# Patient Record
Sex: MALE | Race: WHITE | NOT HISPANIC OR LATINO | Employment: OTHER | URBAN - METROPOLITAN AREA
[De-identification: names, ages, dates, MRNs, and addresses within clinical notes are randomized per-mention and may not be internally consistent; named-entity substitution may affect disease eponyms.]

---

## 2021-04-29 ENCOUNTER — CONSULT (OUTPATIENT)
Dept: PAIN MEDICINE | Facility: CLINIC | Age: 41
End: 2021-04-29
Payer: COMMERCIAL

## 2021-04-29 ENCOUNTER — TELEPHONE (OUTPATIENT)
Dept: PAIN MEDICINE | Facility: CLINIC | Age: 41
End: 2021-04-29

## 2021-04-29 VITALS
BODY MASS INDEX: 36.26 KG/M2 | HEIGHT: 71 IN | HEART RATE: 79 BPM | DIASTOLIC BLOOD PRESSURE: 90 MMHG | SYSTOLIC BLOOD PRESSURE: 130 MMHG | TEMPERATURE: 98.2 F | WEIGHT: 259 LBS

## 2021-04-29 DIAGNOSIS — M54.50 CHRONIC MIDLINE LOW BACK PAIN, UNSPECIFIED WHETHER SCIATICA PRESENT: ICD-10-CM

## 2021-04-29 DIAGNOSIS — M48.061 LUMBAR FORAMINAL STENOSIS: ICD-10-CM

## 2021-04-29 DIAGNOSIS — G89.29 CHRONIC MIDLINE LOW BACK PAIN, UNSPECIFIED WHETHER SCIATICA PRESENT: Primary | ICD-10-CM

## 2021-04-29 DIAGNOSIS — M54.50 CHRONIC MIDLINE LOW BACK PAIN, UNSPECIFIED WHETHER SCIATICA PRESENT: Primary | ICD-10-CM

## 2021-04-29 DIAGNOSIS — G89.29 CHRONIC MIDLINE LOW BACK PAIN, UNSPECIFIED WHETHER SCIATICA PRESENT: ICD-10-CM

## 2021-04-29 PROCEDURE — 99204 OFFICE O/P NEW MOD 45 MIN: CPT | Performed by: ANESTHESIOLOGY

## 2021-04-29 RX ORDER — METHYLPREDNISOLONE 4 MG/1
TABLET ORAL
Qty: 1 EACH | Refills: 0 | Status: SHIPPED | OUTPATIENT
Start: 2021-04-29

## 2021-04-29 RX ORDER — METHYLPREDNISOLONE 4 MG/1
TABLET ORAL
Qty: 1 EACH | Refills: 0 | Status: SHIPPED | OUTPATIENT
Start: 2021-04-29 | End: 2021-04-29 | Stop reason: SDUPTHER

## 2021-04-29 RX ORDER — MULTIVITAMIN
1 TABLET ORAL DAILY
COMMUNITY

## 2021-04-29 NOTE — PATIENT INSTRUCTIONS
Chronic Back Pain   WHAT YOU NEED TO KNOW:   What is chronic back pain? Chronic back pain is back pain that lasts 3 months or longer  This may include pain that has not been controlled or does not improve with treatment  Your back pain may cause weakness or pain that spreads to your arms or legs  What causes or increases my risk for chronic back pain? Conditions that affect the spine, joints, or muscles can cause back pain  These may include arthritis, spinal stenosis (narrowing of the spinal column), muscle tension, or breakdown of the spinal discs  The following increase your risk for back pain:  · Aging    · Lack of regular physical activity     · Repeated bending, lifting, or twisting, or lifting heavy items    · Obesity or pregnancy     · Injury from a fall or accident    · Driving, sitting, or standing for long periods    · Bad posture while sitting or standing    How is chronic back pain diagnosed? Your healthcare provider will ask if you have any medical conditions  He or she may ask if you have a history of back pain and how it started  He or she may watch you stand and walk, and check your range of motion  Show him or her where you feel pain and what makes it better or worse  Describe the pain, how bad it is, and how long it lasts  Tell your provider if your pain worsens at night or when you lie on your back  How is chronic back pain treated? · NSAIDs  help decrease swelling and pain or fever  This medicine is available with or without a doctor's order  NSAIDs can cause stomach bleeding or kidney problems in certain people  If you take blood thinner medicine, always ask your healthcare provider if NSAIDs are safe for you  Always read the medicine label and follow directions  · Acetaminophen  decreases pain and fever  It is available without a doctor's order  Ask how much to take and how often to take it  Follow directions   Read the labels of all other medicines you are using to see if they also contain acetaminophen, or ask your doctor or pharmacist  Acetaminophen can cause liver damage if not taken correctly  Do not use more than 4 grams (4,000 milligrams) total of acetaminophen in one day  · Prescription pain medicine  called narcotics or opioids may be given for certain types of chronic pain  Ask your healthcare provider how to take this medicine safely  · Muscle relaxers  help decrease pain and muscle spasms  · Steroids  decrease inflammation that causes pain  · Anesthetic  medicines may be injected in or around a nerve to block pain signals from the nerves  · Antidepressants  may be used to help decrease or prevent the symptoms of depression or anxiety  They are also used to treat nerve pain  How can I manage my symptoms? · Apply ice for 15 to 20 minutes every hour, or as directed  Use an ice pack, or put crushed ice in a plastic bag  Cover it with a towel before you apply it to your skin  Ice decreases pain and helps prevent tissue damage  · Apply heat for 20 to 30 minutes every 2 hours, or as directed  Heat helps decrease pain and muscle spasms  · Use massage to loosen tense muscles  Massage may relieve back pain caused by tight muscles  Regular massages may help prevent this kind of back pain  · Ask about acupuncture for pain relief  Back pain is sometimes relieved with acupuncture  Talk to your healthcare provider before you get this treatment to make sure it is safe for you  What else can I do to relieve or prevent back pain? · Manage stress  Stress can cause back pain or make it worse  Some ways to reduce stress are listening to music, meditating, or using aromatherapy  It may help to talk with a therapist about anything that is causing you stress  Your healthcare provider can give you more information  · Stay active as much as you can without causing more pain  Ask your healthcare provider what exercises are right for you   Do not sit or lie down for long periods  This could make your back pain worse  Yoga or similar gentle movements may help relieve pain and tension in your back  Go slowly and do not strain your back as you do any movement  · Be careful when you lift heavy objects  Do not lift anything heavy until your pain is gone  Never strain your back when you lift a heavy item  If possible, ask someone to help you  · Go to physical therapy as directed  A physical therapist can teach you exercises to help improve movement and strength, and to decrease pain  When should I call my doctor? · You have severe pain  · You have new numbness, tingling, or weakness, especially in your lower back, legs, arms, or genital area  · You lose control of your bladder or bowel movements  · You have a fever or sudden weight loss  · You have new or worse pain  · You have questions or concerns about your condition or care  CARE AGREEMENT:   You have the right to help plan your care  Learn about your health condition and how it may be treated  Discuss treatment options with your healthcare providers to decide what care you want to receive  You always have the right to refuse treatment  The above information is an  only  It is not intended as medical advice for individual conditions or treatments  Talk to your doctor, nurse or pharmacist before following any medical regimen to see if it is safe and effective for you  © Copyright [address] Information is for End User's use only and may not be sold, redistributed or otherwise used for commercial purposes   All illustrations and images included in CareNotes® are the copyrighted property of [employer]  or [address] [place] [address]

## 2021-04-29 NOTE — TELEPHONE ENCOUNTER
Patient called to make us aware, pharmacy called his script is ready but it was sent to [facility] [place]     Medication: methylPREDNISolone 4 MG tablet therapy pack         Please send script to correct pharmacy     [facility] [address] [phone]    Thank you     [phone]

## 2021-04-29 NOTE — PROGRESS NOTES
Assessment  1  Chronic midline low back pain, unspecified whether sciatica present    2  Lumbar foraminal stenosis        Plan  50-year-old male, self-referred, last seen by our practice in 2015, presenting for interval consultation regarding a long-standing history of lumbosacral back pain without any radicular symptoms into his lower extremities  He denies any specific trauma or inciting event  The pain has worsened over the past year  He does have an old MRI of the lumbar spine from 2006 showing small disc bulge at L4-5 with mild bilateral foraminal stenosis at L4-5 and L5-S1  He has not done any recent formal physical therapy  He has tried Tylenol and NSAIDs without any significant relief  Patient's low back pain does have a myofascial and possibly facet mediated component  Discogenic causes of low back pain also on the differential     1  I will prescribe physical therapy to reduce pain and improved function  2  Medrol Dosepak was prescribed for pain relief  3  X-ray of the lumbar spine has been ordered  4  I will follow up the patient in 6 weeks and if he does not respond to conservative treatment may consider an updated MRI of the lumbar spine without contrast         My impressions and treatment recommendations were discussed in detail with the patient who verbalized understanding and had no further questions  Discharge instructions were provided  I personally saw and examined the patient and I agree with the above discussed plan of care  No orders of the defined types were placed in this encounter      New Medications Ordered This Visit   Medications    Multiple Vitamin (multivitamin) tablet     Sig: Take 1 tablet by mouth daily       History of Present Illness    Ar Cervantes is a 42 y o  male , self-referred, last seen by our practice in 2015, presenting for interval consultation regarding a long-standing history of lumbosacral back pain without any radicular symptoms into his lower extremities  He denies any specific trauma or inciting event  The pain has worsened over the past year  He denies any bladder or bowel incontinence or saddle anesthesia  He does have an old MRI of the lumbar spine from 2006 showing small disc bulge at L4-5 with mild bilateral foraminal stenosis at L4-5 and L5-S1  He has not done any recent formal physical therapy  He has tried Tylenol and NSAIDs without any significant relief  The patient rates his pain a 10/10 on the pain is constant  The pain is not follow any particular pattern throughout the day  The pain is described as shooting, sharp, cutting, pins and needles, pressure like, and throbbing  The pain is increased with prior, standing, bending, walking, exercise, coughing, and sneezing  He denies any alleviating factors  Other than as stated above, the patient denies any interval changes in medications, medical condition, mental condition, symptoms, or allergies since the last office visit  I have personally reviewed and/or updated the patient's past medical history, past surgical history, family history, social history, current medications, allergies, and vital signs today  Review of Systems   Constitutional: Negative for fever and unexpected weight change  HENT: Negative for trouble swallowing  Eyes: Negative for visual disturbance  Respiratory: Negative for shortness of breath and wheezing  Cardiovascular: Negative for chest pain and palpitations  Gastrointestinal: Negative for constipation, diarrhea, nausea and vomiting  Endocrine: Negative for cold intolerance, heat intolerance and polydipsia  Genitourinary: Negative for difficulty urinating and frequency  Musculoskeletal: Positive for back pain and myalgias  Negative for arthralgias, gait problem and joint swelling  Skin: Negative for rash  Neurological: Negative for dizziness, seizures, syncope, weakness and headaches     Hematological: Does not bruise/bleed easily  Psychiatric/Behavioral: Negative for dysphoric mood  All other systems reviewed and are negative  There is no problem list on file for this patient  History reviewed  No pertinent past medical history  History reviewed  No pertinent surgical history  History reviewed  No pertinent family history  Social History     Occupational History    Not on file   Tobacco Use    Smoking status: Current Some Day Smoker    Smokeless tobacco: Never Used   Substance and Sexual Activity    Alcohol use: Yes    Drug use: Never    Sexual activity: Not on file       Current Outpatient Medications on File Prior to Visit   Medication Sig    Multiple Vitamin (multivitamin) tablet Take 1 tablet by mouth daily     No current facility-administered medications on file prior to visit  No Known Allergies    Physical Exam    /90   Pulse 79   Temp 98 2 °F (36 8 °C) (Oral)   Ht 5' 11" (1 803 m)   Wt 117 kg (259 lb)   BMI 36 12 kg/m²     Constitutional: normal, well developed, well nourished, alert, in no distress and non-toxic and no overt pain behavior  Eyes: anicteric  HEENT: grossly intact  Neck: supple, symmetric, trachea midline and no masses   Pulmonary:even and unlabored  Cardiovascular:No edema or pitting edema present  Skin:Normal without rashes or lesions and well hydrated  Psychiatric:Mood and affect appropriate  Neurologic:Cranial Nerves II-XII grossly intact  Musculoskeletal:normal gait  Bilateral lumbar paraspinals tender to palpation ropy in texture  Bilateral SI joints and trochanteric flares nontender to palpation  Bilateral patellar and Achilles reflexes were 2/4 and symmetrical   No clonus was noted bilaterally  Bilateral lower extremity strength 5/5 in all muscle groups  Sensation intact to light touch in L3 through S1 dermatomes bilaterally  Negative straight leg raise bilaterally  Negative Wilbert's test bilaterally      Imaging  Imaging reviewed

## 2021-05-11 ENCOUNTER — APPOINTMENT (OUTPATIENT)
Dept: RADIOLOGY | Age: 41
End: 2021-05-11
Payer: COMMERCIAL

## 2021-05-11 DIAGNOSIS — G89.29 CHRONIC MIDLINE LOW BACK PAIN, UNSPECIFIED WHETHER SCIATICA PRESENT: ICD-10-CM

## 2021-05-11 DIAGNOSIS — M54.50 CHRONIC MIDLINE LOW BACK PAIN, UNSPECIFIED WHETHER SCIATICA PRESENT: ICD-10-CM

## 2021-05-11 PROCEDURE — 72110 X-RAY EXAM L-2 SPINE 4/>VWS: CPT

## 2021-05-12 ENCOUNTER — EVALUATION (OUTPATIENT)
Dept: PHYSICAL THERAPY | Facility: REHABILITATION | Age: 41
End: 2021-05-12
Payer: COMMERCIAL

## 2021-05-12 DIAGNOSIS — G89.29 CHRONIC MIDLINE LOW BACK PAIN, UNSPECIFIED WHETHER SCIATICA PRESENT: Primary | ICD-10-CM

## 2021-05-12 DIAGNOSIS — M54.50 CHRONIC MIDLINE LOW BACK PAIN, UNSPECIFIED WHETHER SCIATICA PRESENT: Primary | ICD-10-CM

## 2021-05-12 PROCEDURE — 97162 PT EVAL MOD COMPLEX 30 MIN: CPT | Performed by: PHYSICAL THERAPIST

## 2021-05-12 NOTE — PROGRESS NOTES
PT Evaluation     Today's date: 2021  Patient name: Alan Lo  : 1980  MRN: [id number]  Referring provider: Jama Murrell DO  Dx:   Encounter Diagnosis     ICD-10-CM    1  Chronic midline low back pain, unspecified whether sciatica present  M54 5 Ambulatory referral to Physical Therapy    G89 29                   Assessment  Assessment details: Patient presents with decreased lumbar ROM, decreased hip/core strength, postural deficits and decreased function secondary to chronic LBP without sciatica  Patient would benefit from skilled PT intervention to address these issues and to maximize function  Thank you for the referral   Impairments: abnormal or restricted ROM, activity intolerance, impaired physical strength, lacks appropriate home exercise program, pain with function, poor posture  and poor body mechanics  Other impairment: impaired LE flexibility  Barriers to therapy: Financial constraints (can only attend 1x/week)  Understanding of Dx/Px/POC: good   Prognosis: good    Goals  Short Term:  Pt will report decreased levels of pain by at least 2 subjective ratings in 4 weeks  Pt will demonstrate improved ROM by at least 25% in 4 weeks  Pt will demonstrate improved strength by 1/2 grade MMT in 4 weeks  Long Term:   Pt will be independent in their HEP in 8 weeks  Pt will demonstrate improved FOTO, > predicted level  Pt will be independent with all ADL's  Pt will resume exercising without pain  Pt will sleep through the night without waking due to pain    Plan  Plan details: Patient was educated in HEP and Plan of Care  All questions were answered to pt's satisfaction      Patient would benefit from: skilled physical therapy  Planned therapy interventions: abdominal trunk stabilization, manual therapy, neuromuscular re-education, patient education, body mechanics training, flexibility, therapeutic exercise, therapeutic activities, home exercise program and graded exercise  Frequency: 1x week  Duration in weeks: 8  Plan of Care beginning date: 2021  Plan of Care expiration date: 2021  Treatment plan discussed with: patient        Subjective Evaluation    History of Present Illness  Mechanism of injury: Pt is a 42 y o male with a c/o ongoing LBP for years, starting around  or so of insidious onset  Pt notes he used to play football and lift heavy weights and feels that may have contributed to it  Pt notes having numerous tests back in  or so and states "my lower back is a mess"  Pt was treated with medication in the past   Pt also notes receiving an injection in lumbar spine around  and experienced relief for several years  Pt states his pain returned about 2 years ago and has progressively worsened  Pt does not recall doing therapy for his lumbar spine  Pt reports pain/diffiuclty with bending down, yardwork (cutting grass), dressing (donning sneakers, tying shoes), showering, prolonged driving, prolonged standing in place, sleep  Pt would like to resume exercising without pain  Pain  At best pain ratin  At worst pain rating: 10  Location: lumbar spine  Relieving factors: change in position      Diagnostic Tests  X-ray: abnormal    [facility] comments: X-rays showed chronic L5 pars defect (grade 2 spondylolisthesis L5-S1)  Treatments  Previous treatment: medication  Patient Goals  Patient goals for therapy: decreased pain, return to sport/leisure activities and independence with ADLs/IADLs          Objective     Concurrent Complaints  Positive for disturbed sleep  Negative for bladder dysfunction, bowel dysfunction and saddle (S4) numbness    Additional Special Questions  Pt denies unexplained weight loss  Postural Observations  Seated posture: fair        Tenderness     Left Hip   Tenderness in the PSIS  Right Hip   Tenderness in the PSIS       Additional Tenderness Details  Mild TTP L3-5 SP    Neurological Testing     Reflexes   Left   Patellar (L4): brisk (3+)  Achilles (S1): normal (2+)  Clonus sign: negative    Right   Patellar (L4): brisk (3+)  Achilles (S1): normal (2+)  Clonus sign: negative    Additional Neurological Details  Pt denies N/T or radicular pain  Active Range of Motion     Additional Active Range of Motion Details  L/S AROM:  Flex=wfl with pain; repeated=same  Ext= 25% with pinching pain; repeated=same  RSB=50% without pain  LSB=75% with pain  R rot=75% with increased pain  L rot=75% with mild pian    Joint Play   Joints within functional limits: L1 and L2     Pain: L1, L2, L3 and L4     Strength/Myotome Testing     Left Hip   Planes of Motion   Flexion: 4+  Extension: 4  Abduction: 4  External rotation: 4-    Right Hip   Planes of Motion   Flexion: 4+  Extension: 4-  Abduction: 4  External rotation: 4-    Left Knee   Flexion: 5  Extension: 5    Right Knee   Flexion: 5  Extension: 5    Left Ankle/Foot   Dorsiflexion: 5  Plantar flexion: 5 (seated)  Great toe extension: 5    Right Ankle/Foot   Dorsiflexion: 5  Plantar flexion: 5 (seated)  Great toe extension: 5    Tests     Lumbar   Positive prone instability  and SIJ compression  Negative sacroiliac distraction and sacral thrust       Left   Negative crossed SLR, femoral stretch, passive SLR and slump test      Right   Negative crossed SLR, femoral stretch, passive SLR and slump test      Left Pelvic Girdle/Sacrum   Positive: thigh thrust and active SLR test      Right Pelvic Girdle/Sacrum   Positive: thigh thrust and active SLR test      Left Hip   Positive ANAYELI  Negative long sit  Right Hip   Positive ANAYELI  Negative long sit  Additional Tests Details  (+)active SLR w/stabilization b/l     General Comments:      Lumbar Comments  Decreased flexibility b/l HS, piriformis  Decreased hip IR b/l                  Precautions: chronic LBP      Manuals             Prone SIJ PA w/hip IR b/l                                                    Neuro Re-Ed             TA w/march TA w/kicks             TA w/bridges             TA w/clamshells             TA w/prone hip ext             Sidestepping w/TB                          Ther Ex             Bike or Nu-step             Seated lumbar flexion w/pball             TA w/TB LPD             TA w/TB multifidus press             TA w/mini squats                                                    Ther Activity                                       Gait Training                                       Modalities

## 2021-05-19 ENCOUNTER — OFFICE VISIT (OUTPATIENT)
Dept: PHYSICAL THERAPY | Facility: REHABILITATION | Age: 41
End: 2021-05-19
Payer: COMMERCIAL

## 2021-05-19 DIAGNOSIS — G89.29 CHRONIC MIDLINE LOW BACK PAIN, UNSPECIFIED WHETHER SCIATICA PRESENT: Primary | ICD-10-CM

## 2021-05-19 DIAGNOSIS — M54.50 CHRONIC MIDLINE LOW BACK PAIN, UNSPECIFIED WHETHER SCIATICA PRESENT: Primary | ICD-10-CM

## 2021-05-19 PROCEDURE — 97112 NEUROMUSCULAR REEDUCATION: CPT | Performed by: PHYSICAL THERAPIST

## 2021-05-19 NOTE — PROGRESS NOTES
Daily Note     Today's date: 2021  Patient name: Eufemia Banuelos  : 1980  MRN: [id number]  Referring provider: Anaid Tobias DO  Dx:   Encounter Diagnosis     ICD-10-CM    1  Chronic midline low back pain, unspecified whether sciatica present  M54 5     G89 29            Pt 1on1 for 22 mins and performed remaining TE's as part of IEP  Subjective: Pt reports HEP is going OK without questions  Pt notes no change in status at this time  Objective: See treatment diary below      Assessment: Tolerated treatment well  Pt fatigues easily with TE performance  Intermittent pinching pain with mini squats, multifidus press with L side towards the wall  Pt notes relief with manuals  Pt reports fatigue post session  Monitor response NV  Patient would benefit from continued PT      Plan: Continue per plan of care  Progress treatment as tolerated         Precautions: chronic LBP      Manuals             Prone SIJ PA w/hip IR b/l TP 5 mins                                                   Neuro Re-Ed             TA w/march x10            TA w/kicks x10            TA w/bridges 3" 2x10            TA w/clamshells 3"x10ea            TA w/prone hip ext 3"x10ea            Sidestepping w/TB otb 1 lap                         Ther Ex             Bike or Nu-step Bike x10'            Seated lumbar flexion w/pball 10"x10            TA w/TB LPD gtb 3"x15            TA w/TB multifidus press gtb            TA w/mini squats x15                                                   Ther Activity                                       Gait Training                                       Modalities

## 2021-05-26 ENCOUNTER — OFFICE VISIT (OUTPATIENT)
Dept: PHYSICAL THERAPY | Facility: REHABILITATION | Age: 41
End: 2021-05-26
Payer: COMMERCIAL

## 2021-05-26 DIAGNOSIS — G89.29 CHRONIC MIDLINE LOW BACK PAIN, UNSPECIFIED WHETHER SCIATICA PRESENT: Primary | ICD-10-CM

## 2021-05-26 DIAGNOSIS — M54.50 CHRONIC MIDLINE LOW BACK PAIN, UNSPECIFIED WHETHER SCIATICA PRESENT: Primary | ICD-10-CM

## 2021-05-26 PROCEDURE — 97112 NEUROMUSCULAR REEDUCATION: CPT | Performed by: PHYSICAL THERAPIST

## 2021-05-26 NOTE — PROGRESS NOTES
Daily Note     Today's date: 2021  Patient name: Edin Higginbotham  : 1980  MRN: [id number]  Referring provider: Kaylee Soto DO  Dx:   Encounter Diagnosis     ICD-10-CM    1  Chronic midline low back pain, unspecified whether sciatica present  M54 5     G89 29    Patient is treated by FORTINO Merritt under direct supervision of Stephane Lewis[facility] and completed rest of Navneet as part of IEP  Subjective: Pt reports completing HEP and feels a stretch with them  Pt complains of pain when bending over and when returning to standing from a seated position  Objective: See treatment diary below      Assessment: Tolerated treatment well  Patient notes relief in low back symptoms with bridges and with mobilizations  Pt reports that when he feels pain during Navneet (especially with squats), the majority is on the L side  Pt notes pinching on L side with multifidus press with L side facing the wall  Patient demonstrated fatigue post treatment and would benefit from continued PT      Plan: Continue per plan of care  Progress treatment as tolerated         Precautions: chronic LBP      Manuals            Prone SIJ PA w/hip IR b/l TP 5 mins TP 5'                                                  Neuro Re-Ed             TA w/march x10 x10           TA w/kicks x10 x10           TA w/bridges 3" 2x10 3" 2x10           TA w/clamshells 3"x10ea 3"x10 ea           TA w/prone hip ext 3"x10ea 3"x10 ea           Sidestepping w/TB otb 1 lap np                        Ther Ex             Bike or Nu-step Bike x10' Bike 10'           Seated lumbar flexion w/pball 10"x10 10"x10           TA w/TB LPD gtb 3"x15 gtb 3" 2x10           TA w/TB multifidus press gtb gtb 3"x10 ea           TA w/mini squats x15 x15                                                  Ther Activity                                       Gait Training                                       Modalities

## 2021-06-02 ENCOUNTER — OFFICE VISIT (OUTPATIENT)
Dept: PHYSICAL THERAPY | Facility: REHABILITATION | Age: 41
End: 2021-06-02
Payer: COMMERCIAL

## 2021-06-02 DIAGNOSIS — G89.29 CHRONIC MIDLINE LOW BACK PAIN, UNSPECIFIED WHETHER SCIATICA PRESENT: Primary | ICD-10-CM

## 2021-06-02 DIAGNOSIS — M54.50 CHRONIC MIDLINE LOW BACK PAIN, UNSPECIFIED WHETHER SCIATICA PRESENT: Primary | ICD-10-CM

## 2021-06-02 PROCEDURE — 97112 NEUROMUSCULAR REEDUCATION: CPT | Performed by: PHYSICAL THERAPIST

## 2021-06-02 NOTE — PROGRESS NOTES
Daily Note     Today's date: 2021  Patient name: Inga Kelly  : 1980  MRN: [id number]  Referring provider: Rajesh Bass DO  Dx:   Encounter Diagnosis     ICD-10-CM    1  Chronic midline low back pain, unspecified whether sciatica present  M54 5     G89 29    Patient is treated by FORTINO Figueroa under direct supervision of Stephane Lewis, PT         1on1 for 20 mins and performed remainder as part of IEP  Subjective: Patient reports he was sick  night from unknown cause and laid on the bathroom floor all night which lead to increased b/l low back pain  Pt required rest Monday and a heating pad, but it had little effect on the pain  Pt went to work on Tuesday but was only able to work for a little bit before he had to lay down with the heating pad  Pt notes he took Ibuprofen this morning to try and help with the pain  Objective: See treatment diary below      Assessment: Tolerated treatment fair  Pt performed modified PT session due to pain  Pt reported reduced pain post soft tissue massage and mobilizations  Prone hip extensions were stopped due to increase in pain  Pt noted that the seated lumbar flexion helped improve symptoms  Patient demonstrated fatigue post treatment and would benefit from continued PT      Plan: Continue per plan of care  Progress treatment as tolerated  Monitor symptoms NV       Precautions: chronic LBP      Manuals  6          Prone SIJ PA w/hip IR b/l TP 5 mins TP 5' TP 4'          IASTM paraspinals   TP 6'                                    Neuro Re-Ed             TA w/march x10 x10 x10          TA w/kicks x10 x10 x10          TA w/bridges 3" 2x10 3" 2x10 3" 2x10          TA w/clamshells 3"x10ea 3"x10 ea held          TA w/prone hip ext 3"x10ea 3"x10 ea 3"x10 ea          Sidestepping w/TB otb 1 lap np held                       Ther Ex             Bike or Nu-step Bike x10' Bike 10' Bike 10'          Seated lumbar flexion w/pball 10"x10 10"x10 10"x10          TA w/TB LPD gtb 3"x15 gtb 3" 2x10 gtb 2x10          TA w/TB multifidus press gtb gtb 3"x10 ea held          TA w/mini squats x15 x15 held                                                 Ther Activity                                       Gait Training                                       Modalities

## 2021-06-09 ENCOUNTER — OFFICE VISIT (OUTPATIENT)
Dept: PHYSICAL THERAPY | Facility: REHABILITATION | Age: 41
End: 2021-06-09
Payer: COMMERCIAL

## 2021-06-09 DIAGNOSIS — M54.50 CHRONIC MIDLINE LOW BACK PAIN, UNSPECIFIED WHETHER SCIATICA PRESENT: Primary | ICD-10-CM

## 2021-06-09 DIAGNOSIS — G89.29 CHRONIC MIDLINE LOW BACK PAIN, UNSPECIFIED WHETHER SCIATICA PRESENT: Primary | ICD-10-CM

## 2021-06-09 PROCEDURE — 97112 NEUROMUSCULAR REEDUCATION: CPT | Performed by: PHYSICAL THERAPIST

## 2021-06-09 NOTE — PROGRESS NOTES
Daily Note     Today's date: 2021  Patient name: Bethena Lesches  : 1980  MRN: [id number]  Referring provider: Miya Carter DO  Dx:   Encounter Diagnosis     ICD-10-CM    1  Chronic midline low back pain, unspecified whether sciatica present  M54 5     G89 29    Patient is treated by Vik Armenta SPT under direct supervision of Stephane Lewis, PT  Subjective: Patient reports he felt better after last session, noting that the IASTM really loosened him up  Pt explains he uses topical cream and heating pad for relief  He notes he is feeling ok today  Pt has doctor appointment tomorrow for back  Objective: See treatment diary below  FOTO was taken this session (current 48; projected 61)  Assessment: Tolerated treatment well  Pt exhibited positive response to manual techniques in session  Pt continues to note difficulty with clamshells and multifidus press  Patient demonstrated fatigue post treatment and would benefit from continued PT      Plan: Continue per plan of care  Progress treatment as tolerated         Precautions: chronic LBP      Manuals          Prone SIJ PA w/hip IR b/l TP 5 mins TP 5' TP 4' PS 4'         IASTM paraspinals   TP 6' TP 6'                                   Neuro Re-Ed             TA w/march x10 x10 x10 x10         TA w/kicks x10 x10 x10 x10         TA w/bridges 3" 2x10 3" 2x10 3" 2x10 3" 2x10         TA w/clamshells 3"x10ea 3"x10 ea held 3"x10 ea         TA w/prone hip ext 3"x10ea 3"x10 ea 3"x10 ea 3"x10ea         Sidestepping w/TB otb 1 lap np held nv                      Ther Ex             Bike or Nu-step Bike x10' Bike 10' Bike 10' Bike 10'          Seated lumbar flexion w/pball 10"x10 10"x10 10"x10 10"x10         TA w/TB LPD gtb 3"x15 gtb 3" 2x10 gtb 2x10 gtb 2x10         TA w/TB multifidus press gtb gtb 3"x10 ea held gtb 3"x10 ea         TA w/mini squats x15 x15 held [employer]                                                Ther Activity Gait Training                                       Modalities

## 2021-06-10 ENCOUNTER — OFFICE VISIT (OUTPATIENT)
Dept: PAIN MEDICINE | Facility: CLINIC | Age: 41
End: 2021-06-10
Payer: COMMERCIAL

## 2021-06-10 VITALS
HEART RATE: 57 BPM | SYSTOLIC BLOOD PRESSURE: 123 MMHG | WEIGHT: 258 LBS | BODY MASS INDEX: 36.12 KG/M2 | DIASTOLIC BLOOD PRESSURE: 79 MMHG | HEIGHT: 71 IN

## 2021-06-10 DIAGNOSIS — G89.29 CHRONIC MIDLINE LOW BACK PAIN, UNSPECIFIED WHETHER SCIATICA PRESENT: Primary | ICD-10-CM

## 2021-06-10 DIAGNOSIS — M54.50 CHRONIC MIDLINE LOW BACK PAIN, UNSPECIFIED WHETHER SCIATICA PRESENT: Primary | ICD-10-CM

## 2021-06-10 DIAGNOSIS — M46.1 SACROILIITIS (HCC): ICD-10-CM

## 2021-06-10 PROCEDURE — 99214 OFFICE O/P EST MOD 30 MIN: CPT | Performed by: NURSE PRACTITIONER

## 2021-06-10 NOTE — PATIENT INSTRUCTIONS
Sacroiliac Joint Injection   WHAT YOU NEED TO KNOW:   What do I need to know about a sacroiliac joint injection? A sacroiliac (SI) joint injection is done to diagnose or treat pain from sacroiliac joint syndrome  The pain caused by this syndrome may be felt in your lower back, buttocks, groin, and your thigh  How do I prepare for an SI injection? Your healthcare provider will ask you to not take any pain medicine the day of the injection  Ask him if there are any other medicines you should not take  You will need to find someone to drive you home after your procedure  What will happen during the SI injection? You will be awake for your injection  You may be given calming medicine if you are anxious  · You will lie on your stomach with a pillow under your abdomen  Your healthcare provider will give you an injection of medicine to numb the area  He may use an x-ray, ultrasound, or CT scan to find the area to inject  You may also be given an injection of contrast material to help your SI joint show up better  Then, your healthcare provider will inject local anesthesia, antiinflammatory medicine, or both into your SI joint  · Healthcare providers will watch you closely for any problems for up to 30 minutes after your injection  Your healthcare provider will check to see if your pain decreases after the injection  What are the risks of an SI injection? You may have some weakness for a short time after your injection  The SI injection can cause bleeding, infection, and pain  It can also cause temporary weakness in your leg and problems urinating  You may have an allergic reaction to the medicine that is injected into your SI joint  Your pain may return and you may need more treatment  CARE AGREEMENT:   You have the right to help plan your care  Learn about your health condition and how it may be treated  Discuss treatment options with your healthcare providers to decide what care you want to receive   You always have the right to refuse treatment  The above information is an  only  It is not intended as medical advice for individual conditions or treatments  Talk to your doctor, nurse or pharmacist before following any medical regimen to see if it is safe and effective for you  © Copyright [address] Information is for End User's use only and may not be sold, redistributed or otherwise used for commercial purposes   All illustrations and images included in CareNotes® are the copyrighted property of A D A M , Inc  or [address] [place] [address]

## 2021-06-10 NOTE — PROGRESS NOTES
Assessment:  1  Chronic midline low back pain, unspecified whether sciatica present    2  Sacroiliitis ([address] )        Plan:  1  Based on patient report and physical exam, the patient's symptomatology does seem to be consistent with sacroiliac mediated pain from sacroiliitis  We will schedule the patient for a right SIJ injection to decrease any inflammatory component of the patient's pain symptoms  Complete risks and benefits including bleeding, infection, tissue reaction, nerve injury and allergic reaction were discussed  The patient was agreeable and verbalized an understanding  2  Patient will continue with physical therapy  He does find some transient improvement with this  He is scheduled for 3 more sessions  If no relief after conservative therapy is completed, will order an MRI of the lumbar spine without contrast  3  Patient is not interested in oral medications at this time secondary to GI side effects  4  Patient will follow-up 4 weeks after the procedure or sooner if needed     [employer] software was used to dictate this note  It may contain errors with dictating incorrect words or incorrect spelling  Please contact the provider directly with any questions  History of Present Illness: The patient is a 42 y o  male last seen on 4/29/21 who presents for a follow up office visit in regards to chronic  Axial low back pain that is nonradiating into the lower extremities  the patient denies bowel or bladder incontinence or saddle anesthesia  X-ray of the lumbar spine reveals chronic bilateral pars defects at L5 with grade 2 spondylolisthesis of L5 on S1 with moderate degenerative disc disease and minimal facet degenerative changes at this level  The patient has been participating in physical therapy and has completed 5 sessions at this time with transient improvement of his symptoms  He rates his pain a 10/10 on the numeric pain rating scale    He states the pain is constant nature and bothersome throughout the entirety of the day  He characterizes the pain as burning, sharp, throbbing, pressure-like and shooting  The patient will occasionally take over-the-counter Aleve, Advil or Tylenol  He states he has taken Opana in the past and had a terrible time discontinuing the medication with experiencing significant withdrawal effects and would like to avoid any oral medications at this time  He was given an oral steroid after last office visit, however states he only took 1 pill because it caused rather extreme nausea  I have personally reviewed and/or updated the patient's past medical history, past surgical history, family history, social history, current medications, allergies, and vital signs today  Review of Systems:    Review of Systems   Respiratory: Negative for shortness of breath  Cardiovascular: Negative for chest pain  Gastrointestinal: Negative for constipation, diarrhea, nausea and vomiting  Musculoskeletal: Negative for arthralgias, gait problem, joint swelling and myalgias  Skin: Negative for rash  Neurological: Negative for dizziness, seizures and weakness  All other systems reviewed and are negative  History reviewed  No pertinent past medical history  History reviewed  No pertinent surgical history  History reviewed  No pertinent family history  Social History     Occupational History    Not on file   Tobacco Use    Smoking status: Current Some Day Smoker    Smokeless tobacco: Never Used   Substance and Sexual Activity    Alcohol use:  Yes    Drug use: Never    Sexual activity: Not on file         Current Outpatient Medications:     methylPREDNISolone 4 MG tablet therapy pack, Use as directed on package (Patient not taking: Reported on 6/10/2021), Disp: 1 each, Rfl: 0    Multiple Vitamin (multivitamin) tablet, Take 1 tablet by mouth daily, Disp: , Rfl:     No Known Allergies    Physical Exam:    /79   Pulse 57   Ht 5' 11" (1 803 m)   Wt 117 kg (258 lb)   BMI 35 98 kg/m²     Constitutional:normal, well developed, well nourished, alert, in no distress and non-toxic and no overt pain behavior  Eyes:anicteric  HEENT:grossly intact  Neck:supple, symmetric, trachea midline and no masses   Pulmonary:even and unlabored  Cardiovascular:No edema or pitting edema present  Skin:Normal without rashes or lesions and well hydrated  Psychiatric:Mood and affect appropriate  Neurologic:Cranial Nerves II-XII grossly intact  Musculoskeletal:Gait  Bilateral lumbar paraspinal musculature tender to palpation  Right SI joint tender to palpation  Bilateral lower extremity strength 5/5 in all muscle groups  Negative straight leg raise bilaterally  Positive Wilbert's, Tawnya finger and Gaenslen's test on the right negative on the left      Imaging  FL spine and pain procedure    (Results Pending)   LUMBAR SPINE   INDICATION: M54 5: Low back pain   G89 29: Other chronic pain  COMPARISON: None   VIEWS: XR SPINE LUMBAR MINIMUM 4 VIEWS NON INJURY   Upright images  FINDINGS:   There are 5 non rib bearing lumbar vertebral bodies  There is no evidence of acute fracture or destructive osseous lesion  Chronic bilateral pars defect at L5 with grade 2 spondylolisthesis of L5 on S1  Moderate disc height loss and minimal facet degenerative changes at L5-S1  Soft tissues are unremarkable  IMPRESSION:   No acute osseous abnormality  Chronic bilateral pars defect at L5 with grade 2 spondylolisthesis of L5 on S1  Degenerative changes as described           Orders Placed This Encounter   Procedures    FL spine and pain procedure

## 2021-06-16 ENCOUNTER — OFFICE VISIT (OUTPATIENT)
Dept: PHYSICAL THERAPY | Facility: REHABILITATION | Age: 41
End: 2021-06-16
Payer: COMMERCIAL

## 2021-06-16 DIAGNOSIS — M54.50 CHRONIC MIDLINE LOW BACK PAIN, UNSPECIFIED WHETHER SCIATICA PRESENT: Primary | ICD-10-CM

## 2021-06-16 DIAGNOSIS — G89.29 CHRONIC MIDLINE LOW BACK PAIN, UNSPECIFIED WHETHER SCIATICA PRESENT: Primary | ICD-10-CM

## 2021-06-16 PROCEDURE — 97112 NEUROMUSCULAR REEDUCATION: CPT | Performed by: PHYSICAL THERAPIST

## 2021-06-16 NOTE — PROGRESS NOTES
Daily Note     Today's date: 2021  Patient name: Allen Mays  : 1980  MRN: [id number]  Referring provider: Tammy Sotelo DO  Dx:   Encounter Diagnosis     ICD-10-CM    1  Chronic midline low back pain, unspecified whether sciatica present  M54 5     G89 29    Patient is treated by FORTINO Sharma under direct supervision of Stephane Lewis, PT         1on1 for 22 mins and performed remainder as part of IEP  Subjective: Pt reports no new complaints and feels about the same usual  Pt noted he is scheduled to get an injection  into his R SI joint  Pt explained he spent the weekend putting up lights on his house and bushes, along with yard work and required breaks due to pain from bending over  Objective: See treatment diary below      Assessment: Tolerated treatment well  Pt expresses increases in discomfort (points to lumbar and SI region) and difficulty with clamshell exercises  Pt noted relief with manuals during session and had reduced discomfort post session  Pt notes he typically experiences relief for  2 days after his PT sessions  Patient demonstrated fatigue post treatment and would benefit from continued PT      Plan: Continue per plan of care  Progress treatment as tolerated         Precautions: chronic LBP      Manuals         Prone SIJ PA w/hip IR b/l TP 5 mins TP 5' TP 4' PS 4' PS 5'        IASTM paraspinals   TP 6' TP 6' TP 6'                                  Neuro Re-Ed             TA w/march x10 x10 x10 x10 x10 x15       TA w/kicks x10 x10 x10 x10 x10 x15       TA w/bridges 3" 2x10 3" 2x10 3" 2x10 3" 2x10 3" 2x15        TA w/clamshells 3"x10ea 3"x10 ea held 3"x10 ea 3"x10ea        TA w/prone hip ext 3"x10ea 3"x10 ea 3"x10 ea 3"x10ea 3"x10 ea        Sidestepping w/TB otb 1 lap np held [employer]                     Ther Ex             Bike or Nu-step Bike x10' Bike 10' Bike 10' Bike 10'  Bike 10'        Seated lumbar flexion w/pball 10"x10 10"x10 10"x10 10"x10 10"x10        TA w/TB LPD gtb 3"x15 gtb 3" 2x10 gtb 2x10 gtb 2x10 gtb 2x10        TA w/TB multifidus press gtb gtb 3"x10 ea held gtb 3"x10 ea gtb 3"x10ea        TA w/mini squats x15 x15 held [employer]                                               Ther Activity                                       Gait Training                                       Modalities

## 2021-06-21 ENCOUNTER — HOSPITAL ENCOUNTER (OUTPATIENT)
Dept: RADIOLOGY | Facility: CLINIC | Age: 41
Discharge: HOME | End: 2021-06-21
Attending: ANESTHESIOLOGY | Admitting: ANESTHESIOLOGY
Payer: COMMERCIAL

## 2021-06-21 VITALS
RESPIRATION RATE: 18 BRPM | DIASTOLIC BLOOD PRESSURE: 96 MMHG | HEART RATE: 64 BPM | SYSTOLIC BLOOD PRESSURE: 144 MMHG | TEMPERATURE: 97.9 F | OXYGEN SATURATION: 94 %

## 2021-06-21 DIAGNOSIS — M46.1 SACROILIITIS (HCC): ICD-10-CM

## 2021-06-21 PROCEDURE — 27096 INJECT SACROILIAC JOINT: CPT | Performed by: ANESTHESIOLOGY

## 2021-06-21 RX ORDER — LIDOCAINE HYDROCHLORIDE 10 MG/ML
5 INJECTION, SOLUTION EPIDURAL; INFILTRATION; INTRACAUDAL; PERINEURAL ONCE
Status: COMPLETED | OUTPATIENT
Start: 2021-06-21 | End: 2021-06-21

## 2021-06-21 RX ORDER — METHYLPREDNISOLONE ACETATE 40 MG/ML
40 INJECTION, SUSPENSION INTRA-ARTICULAR; INTRALESIONAL; INTRAMUSCULAR; PARENTERAL; SOFT TISSUE ONCE
Status: COMPLETED | OUTPATIENT
Start: 2021-06-21 | End: 2021-06-21

## 2021-06-21 RX ORDER — BUPIVACAINE HCL/PF 2.5 MG/ML
30 VIAL (ML) INJECTION ONCE
Status: COMPLETED | OUTPATIENT
Start: 2021-06-21 | End: 2021-06-21

## 2021-06-21 RX ADMIN — LIDOCAINE HYDROCHLORIDE 2 ML: 10 INJECTION, SOLUTION EPIDURAL; INFILTRATION; INTRACAUDAL; PERINEURAL at 15:44

## 2021-06-21 RX ADMIN — BUPIVACAINE HYDROCHLORIDE 1.5 ML: 2.5 INJECTION, SOLUTION EPIDURAL; INFILTRATION; INTRACAUDAL at 15:45

## 2021-06-21 RX ADMIN — IOHEXOL 0.5 ML: 300 INJECTION, SOLUTION INTRAVENOUS at 15:45

## 2021-06-21 RX ADMIN — METHYLPREDNISOLONE ACETATE 40 MG: 40 INJECTION, SUSPENSION INTRA-ARTICULAR; INTRALESIONAL; INTRAMUSCULAR; SOFT TISSUE at 15:45

## 2021-06-21 NOTE — DISCHARGE INSTR - LAB

## 2021-06-21 NOTE — H&P
History of Present Illness: The patient is a 42 y o  male who presents with complaints of  Right-sided low back pain  Patient Active Problem List   Diagnosis    Chronic midline low back pain    Lumbar foraminal stenosis       No past medical history on file  No past surgical history on file  Current Outpatient Medications:     methylPREDNISolone 4 MG tablet therapy pack, Use as directed on package (Patient not taking: Reported on 6/10/2021), Disp: 1 each, Rfl: 0    Multiple Vitamin (multivitamin) tablet, Take 1 tablet by mouth daily, Disp: , Rfl:     Current Facility-Administered Medications:     bupivacaine (PF) (MARCAINE) 0 25 % injection 30 mL, 30 mL, Intra-articular, Once, Andrea Sanderson, DO    iohexol (OMNIPAQUE) 300 mg/mL injection 50 mL, 50 mL, Intra-articular, Once, Andrea Sanderson, DO    lidocaine (PF) (XYLOCAINE-MPF) 1 % injection 5 mL, 5 mL, Other, Once, Andrea Sanderson, DO    methylPREDNISolone acetate (DEPO-MEDROL) injection 40 mg, 40 mg, Intra-articular, Once, Gilma Batres,     No Known Allergies    Physical Exam:   Vitals:    06/21/21 1523   BP: 143/81   Pulse: 67   Resp: 18   Temp: 97 9 °F (36 6 °C)   SpO2: 94%     General: Awake, Alert, Oriented x 3, Mood and affect appropriate  Respiratory: Respirations even and unlabored  Cardiovascular: Peripheral pulses intact; no edema  Musculoskeletal Exam:   Tenderness to palpation over right SI joint  ASA Score: 2    Patient/Chart Verification  Patient ID Verified: Verbal  ID Band Applied: No  Consents Confirmed: Procedural, To be obtained in the Pre-Procedure area  H&P( within 30 days) Verified: To be obtained in the Pre-Procedure area  Allergies Reviewed: Yes  Anticoag/NSAID held?: No  Currently on antibiotics?: No    Assessment:   1   Sacroiliitis ([facility] [address] )        Plan: Right SIJ  Injection

## 2021-06-23 ENCOUNTER — OFFICE VISIT (OUTPATIENT)
Dept: PHYSICAL THERAPY | Facility: REHABILITATION | Age: 41
End: 2021-06-23
Payer: COMMERCIAL

## 2021-06-23 DIAGNOSIS — M54.50 CHRONIC MIDLINE LOW BACK PAIN, UNSPECIFIED WHETHER SCIATICA PRESENT: Primary | ICD-10-CM

## 2021-06-23 DIAGNOSIS — G89.29 CHRONIC MIDLINE LOW BACK PAIN, UNSPECIFIED WHETHER SCIATICA PRESENT: Primary | ICD-10-CM

## 2021-06-23 PROCEDURE — 97112 NEUROMUSCULAR REEDUCATION: CPT | Performed by: PHYSICAL THERAPIST

## 2021-06-23 NOTE — PROGRESS NOTES
Daily Note     Today's date: 2021  Patient name: Alan Lo  : 1980  MRN: [id number]  Referring provider: Jama Murrell DO  Dx:   Encounter Diagnosis     ICD-10-CM    1  Chronic midline low back pain, unspecified whether sciatica present  M54 5     G89 29    Patient is treated by FORTINO Rich under direct supervision of Stephane Lewis, PT          1on1 for 22 mins and performed remainder as part of IEP  Subjective: Pt reports that he received an injection into his low back on Monday and notes he is a little sore from it today  Objective: See treatment diary below      Assessment: Tolerated treatment well  Held on progressions and IASTM in session today due to recent injection and associated soreness  Monitor pt's response NV to injection to determine appropriate progressions  Pt required VCs for correct performance of exercises  Pt noted discomfort on his R with side stepping to the L  Patient demonstrated fatigue post treatment and would benefit from continued PT      Plan: Continue per plan of care  Progress treatment as tolerated         Precautions: chronic LBP      Manuals        Prone SIJ PA w/hip IR b/l TP 5 mins TP 5' TP 4' PS 4' PS 5' PS 5'       IASTM paraspinals   TP 6' TP 6' TP 6' held                                 Neuro Re-Ed             TA w/march x10 x10 x10 x10 x10 x10 x15      TA w/kicks x10 x10 x10 x10 x10 x10 x15      TA w/bridges 3" 2x10 3" 2x10 3" 2x10 3" 2x10 3" 2x15 3" 2x15       TA w/clamshells 3"x10ea 3"x10 ea held 3"x10 ea 3"x10ea 3"x10ea       TA w/prone hip ext 3"x10ea 3"x10 ea 3"x10 ea 3"x10ea 3"x10 ea 3"x10 ea       Sidestepping w/TB otb 1 lap np held [employer] otb 2 laps                    Ther Ex             Bike or Nu-step Bike x10' Bike 10' Bike 10' Bike 10'  Bike 10' Bike 10'       Seated lumbar flexion w/pball 10"x10 10"x10 10"x10 10"x10 10"x10 10"x10       TA w/TB LPD gtb 3"x15 gtb 3" 2x10 gtb 2x10 gtb 2x10 gtb 2x10 gtb 3" 2x10       TA w/TB multifidus press gtb gtb 3"x10 ea held gtb 3"x10 ea gtb 3"x10ea gtb 3"x10 ea       TA w/mini squats x15 x15 held nv nv x10                                              Ther Activity                                       Gait Training                                       Modalities

## 2021-06-28 ENCOUNTER — TELEPHONE (OUTPATIENT)
Dept: PAIN MEDICINE | Facility: CLINIC | Age: 41
End: 2021-06-28

## 2021-06-30 ENCOUNTER — OFFICE VISIT (OUTPATIENT)
Dept: PHYSICAL THERAPY | Facility: REHABILITATION | Age: 41
End: 2021-06-30
Payer: COMMERCIAL

## 2021-06-30 DIAGNOSIS — M54.50 CHRONIC MIDLINE LOW BACK PAIN, UNSPECIFIED WHETHER SCIATICA PRESENT: Primary | ICD-10-CM

## 2021-06-30 DIAGNOSIS — G89.29 CHRONIC MIDLINE LOW BACK PAIN, UNSPECIFIED WHETHER SCIATICA PRESENT: Primary | ICD-10-CM

## 2021-06-30 PROCEDURE — 97112 NEUROMUSCULAR REEDUCATION: CPT | Performed by: PHYSICAL THERAPIST

## 2021-06-30 NOTE — PROGRESS NOTES
PT Re-Evaluation     Today's date: 2021  Patient name: Maikol Urena  : 1980  MRN: [id number]  Referring provider: Serafin Melendez DO  Dx:   Encounter Diagnosis     ICD-10-CM    1  Chronic midline low back pain, unspecified whether sciatica present  M54 5     G89 29    Patient is treated by Tierney Atkins SPT under direct supervision of Stephane Lewis, PT  Updated tests and measures recorded this session  Updated HEP was reviewed and issued to pt  Assessment  Assessment details: Patient is a 42 y o  male presenting for re-evaluation with chronic LBP and SIJ dysfunction  Pt demonstrated improvements in strength and ROM, but continues to have decr flexibility and pain provocation with strength testing and other special tests (+ prone instability, thigh thrust, active SLR and ANAYELI)  Pt exhibited decr pain on the R side (- palpation of SIJ and PSIS on R) which received a steroid injection on  and has continued pain on the L  Pt would benefit from continued Physical Therapy to address these impairments and improve pain and function  Pt was directed to contact physician as pt is inquiring about possible MRI or injection on L side  Pt was also provided with an updated HEP and demonstrated good understanding  Please advise if continued PT is desired  Thank you      Impairments: abnormal or restricted ROM, activity intolerance, impaired physical strength and pain with function  Barriers to therapy: Self pay (only attending 1x/week)  Understanding of Dx/Px/POC: good   Prognosis: good    Goals  Short Term:  Pt will report decreased levels of pain by at least 2 subjective ratings in 4 weeks - not met  Pt will demonstrate improved ROM by at least 25% in 4 weeks - partially met  Pt will demonstrate improved strength by 1/2 grade MMT in 4 weeks - met  Long Term:   Pt will be independent in their HEP in 8 weeks - not met  Pt will demonstrate improved FOTO, > predicted level (current 46; projected 61) - not met  Pt will be independent with all ADL's - parially met (independent but has pain)  Pt will resume exercising without pain - not met  Pt will sleep through the night without waking due to pain - not met (wakes 2-3 times)    Plan  Plan details: Pt was educated on plan and provided with updated HEP  All questions were answered to pt satisfaction  Patient would benefit from: skilled physical therapy  Planned therapy interventions: joint mobilization, manual therapy, massage, abdominal trunk stabilization, neuromuscular re-education, patient education, strengthening, stretching, therapeutic activities, therapeutic exercise, home exercise program, functional ROM exercises and flexibility  Frequency: 1x week  Duration in weeks: 6  Plan of Care beginning date: 2021  Plan of Care expiration date: 2021  Treatment plan discussed with: patient        Subjective Evaluation    History of Present Illness  Mechanism of injury: Patient is a 42 y o  male presenting for re-evaluation with chronic LBP  Pt received a steroid injection to the R SIJ on  and reports slight relief on the R, but notes he is experiencing symptoms on the L now as well  Pt reports continued difficulty/pain with bending over, yard work (weed wacking/hedge trimming), dressing (donning sneakers/tying shoes), prolonged sitting (driving), standing and walking,stairs (more than 1 flight), sleeping (able to readjust and fall back to sleep) but explains the pain is more on the L now  Pt reports relief with bridges and Ibuprofen (doesn't take often, but took before bed)     Pain  Current pain ratin  At best pain ratin  At worst pain rating: 10  Quality: sharp  Relieving factors: medications and change in position  Aggravating factors: standing, sitting and walking  Progression: no change    Treatments  Current treatment: injection treatment and physical therapy  Patient Goals  Patient goals for therapy: decreased pain, independence with ADLs/IADLs and return to sport/leisure activities          Objective     Tenderness     Left Hip   Tenderness in the PSIS  Active Range of Motion     Additional Active Range of Motion Details  AROM (% of normal):  Flex: WFL (pain); repeated: no change  Ext: 25%; repeated: incr pain  R SB: 75% (pain on L)  L SB: 75%  R Rot: 75% (pain on L)  L Rot: 75% (pain on L)    Joint Play     Pain: L3, L4, L5 and S1     Strength/Myotome Testing     Left Hip   Planes of Motion   Flexion: 4+ (pain )  Extension: 4 (pain)  Abduction: 4+ (pain)  External rotation: 5 (pain)    Right Hip   Planes of Motion   Flexion: 4+  Extension: 4  Abduction: 5 (pain)  External rotation: 5 (pain)    Tests     Lumbar   Positive prone instability  and sacral spring   Left Pelvic Girdle/Sacrum   Positive: thigh thrust    Negative: active SLR test      Right Pelvic Girdle/Sacrum   Positive: thigh thrust and active SLR test      Left Hip   Positive ANAYELI  Right Hip   Positive ANAYELI       Additional Tests Details  Active SLR with stabilization: (+) on R  Active SLR on R incr pain on L  SIJ s/l compression provided relief   Decr flexibility b/l hamstring and piriformis  SIJ palpation on L incr pain; no pain on R           Precautions: chronic LBP        Manuals 5/19 5/24 6/2 6/9 6/16 6/23 6/30         Prone SIJ PA w/hip IR b/l TP 5 mins TP 5' TP 4' PS 4' PS 5' PS 5'           IASTM paraspinals     TP 6' TP 6' TP 6' held                                                           Neuro Re-Ed                       TA w/march x10 x10 x10 x10 x10 x10   x15       TA w/kicks x10 x10 x10 x10 x10 x10   x15       TA w/bridges 3" 2x10 3" 2x10 3" 2x10 3" 2x10 3" 2x15 3" 2x15           TA w/clamshells 3"x10ea 3"x10 ea held 3"x10 ea 3"x10ea 3"x10ea           TA w/prone hip ext 3"x10ea 3"x10 ea 3"x10 ea 3"x10ea 3"x10 ea 3"x10 ea           Sidestepping w/TB otb 1 lap np held nv nv otb 2 laps                                  Ther Ex                       Bike or Nu-step Bike x10' Bike 10' Bike 10' Bike 10'  Bike 10' Bike 10'  10' bike         Seated lumbar flexion w/pball 10"x10 10"x10 10"x10 10"x10 10"x10 10"x10           TA w/TB LPD gtb 3"x15 gtb 3" 2x10 gtb 2x10 gtb 2x10 gtb 2x10 gtb 3" 2x10           TA w/TB multifidus press gtb gtb 3"x10 ea held gtb 3"x10 ea gtb 3"x10ea gtb 3"x10 ea           TA w/mini squats x15 x15 held nv nv x10            Planks                                                                       Ther Activity                                                                       Gait Training                                                                       Modalities

## 2021-07-06 NOTE — TELEPHONE ENCOUNTER
Will discuss next step in treatment strategy at next office visit  Patient does have an appointment scheduled with us August 9, 2021    If the patient would like a sooner office visit, and if 1 is available, please offer

## 2021-07-07 NOTE — TELEPHONE ENCOUNTER
S/w the patient to review and he stated he will call and reschedule his office visit for an earlier appointment

## 2021-07-22 ENCOUNTER — OFFICE VISIT (OUTPATIENT)
Dept: PAIN MEDICINE | Facility: CLINIC | Age: 41
End: 2021-07-22
Payer: COMMERCIAL

## 2021-07-22 VITALS
WEIGHT: 257 LBS | SYSTOLIC BLOOD PRESSURE: 131 MMHG | HEART RATE: 59 BPM | DIASTOLIC BLOOD PRESSURE: 90 MMHG | BODY MASS INDEX: 35.98 KG/M2 | HEIGHT: 71 IN

## 2021-07-22 DIAGNOSIS — M46.1 SACROILIITIS (HCC): Primary | ICD-10-CM

## 2021-07-22 DIAGNOSIS — G89.29 CHRONIC BILATERAL LOW BACK PAIN WITHOUT SCIATICA: ICD-10-CM

## 2021-07-22 DIAGNOSIS — M54.50 CHRONIC BILATERAL LOW BACK PAIN WITHOUT SCIATICA: ICD-10-CM

## 2021-07-22 PROCEDURE — 99214 OFFICE O/P EST MOD 30 MIN: CPT | Performed by: NURSE PRACTITIONER

## 2021-07-22 NOTE — PROGRESS NOTES
Assessment:  1  Chronic bilateral low back pain without sciatica    2  Sacroiliitis ([address] ) - Left        Plan:  1  Based on patient report and physical exam, the patient's symptomatology does seem to be consistent with sacroiliac mediated pain from sacroiliitis  We will schedule the patient for a left SIJ injection to decrease any inflammatory component of the patient's pain symptoms  Complete risks and benefits including bleeding, infection, tissue reaction, nerve injury and allergic reaction were discussed  The patient was agreeable and verbalized an understanding  2  I will order an MRI of the lumbar spine without contrast   3  Patient may continue over-the-counter ibuprofen p r n  should not exceed more than 2400 mg in 24 hours   4  Patient will continue with home exercise program as taught to him by physical therapy  5  I will follow up with the patient after his procedure or sooner if needed     M*Modal software was used to dictate this note  It may contain errors with dictating incorrect words or incorrect spelling  Please contact the provider directly with any questions  History of Present Illness: The patient is a 42 y o  male last seen on 6/10/21 who presents for a follow up office visit in regards to chronic low back pain that is nonradiating into the lower extremities  The patient denies bowel or bladder incontinence or saddle anesthesia  Patient is status post right SI joint injection with Dr Vivien Boudreaux on June 21, 2021  He reports almost complete improvement of his right-sided low back pain from this procedure at this time, however now he states he notices the same pain on the left side  X-ray of the lumbar spine reveals chronic bilateral pars defects at L5 with grade 2 spondylolisthesis of L5 on S1 with moderate degenerative disc disease and minimal facet degenerative changes at this level    The patient has completed a full course of physical therapy without any significant improvement in his left-sided low back pain at this time  He will take over-the-counter ibuprofen intermittently  He rates his pain as 7/10 on the numeric pain rating scale  States his pain is constant nature bothersome the morning and at night  He characterizes the pain as sharp and pressure like and shooting    I have personally reviewed and/or updated the patient's past medical history, past surgical history, family history, social history, current medications, allergies, and vital signs today  Review of Systems:    Review of Systems   Respiratory: Negative for shortness of breath  Cardiovascular: Negative for chest pain  Gastrointestinal: Negative for constipation, diarrhea, nausea and vomiting  Musculoskeletal: Negative for arthralgias, gait problem, joint swelling and myalgias  Skin: Negative for rash  Neurological: Negative for dizziness, seizures and weakness  All other systems reviewed and are negative  No past medical history on file  No past surgical history on file  No family history on file  Social History     Occupational History    Not on file   Tobacco Use    Smoking status: Current Some Day Smoker    Smokeless tobacco: Never Used   Substance and Sexual Activity    Alcohol use: Yes    Drug use: Never    Sexual activity: Not on file         Current Outpatient Medications:     methylPREDNISolone 4 MG tablet therapy pack, Use as directed on package (Patient not taking: Reported on 6/10/2021), Disp: 1 each, Rfl: 0    Multiple Vitamin (multivitamin) tablet, Take 1 tablet by mouth daily, Disp: , Rfl:     No Known Allergies    Physical Exam:    /90   Pulse 59   Ht 5' 11" (1 803 m)   Wt 117 kg (257 lb)   BMI 35 84 kg/m²     Constitutional:normal, well developed, well nourished, alert, in no distress and non-toxic and no overt pain behavior    Eyes:anicteric  HEENT:grossly intact  Neck:supple, symmetric, trachea midline and no masses   Pulmonary:even and unlabored  Cardiovascular:No edema or pitting edema present  Skin:Normal without rashes or lesions and well hydrated  Psychiatric:Mood and affect appropriate  Neurologic:Cranial Nerves II-XII grossly intact  Musculoskeletal:normal gait  Left SI joint tender to palpation  Left lumbar paraspinal musculature mildly tender to palpation  Positive Tawnya finger, Wilbert's, Gaenslen's and AP compression test on the left and negative on the right  Negative straight leg raise bilaterally      Imaging  MRI lumbar spine without contrast    (Results Pending)   FL spine and pain procedure    (Results Pending)     LUMBAR SPINE   INDICATION: M54 5: Low back pain   G89 29: Other chronic pain  COMPARISON: None   VIEWS: XR SPINE LUMBAR MINIMUM 4 VIEWS NON INJURY   Upright images  FINDINGS:   There are 5 non rib bearing lumbar vertebral bodies  There is no evidence of acute fracture or destructive osseous lesion  Chronic bilateral pars defect at L5 with grade 2 spondylolisthesis of L5 on S1  Moderate disc height loss and minimal facet degenerative changes at L5-S1  Soft tissues are unremarkable  IMPRESSION:   No acute osseous abnormality  Chronic bilateral pars defect at L5 with grade 2 spondylolisthesis of L5 on S1  Degenerative changes as described         Orders Placed This Encounter   Procedures    MRI lumbar spine without contrast    FL spine and pain procedure

## 2021-07-22 NOTE — PATIENT INSTRUCTIONS
Sacroiliac Joint Injection   WHAT YOU NEED TO KNOW:   What do I need to know about a sacroiliac joint injection? A sacroiliac (SI) joint injection is done to diagnose or treat pain from sacroiliac joint syndrome  The pain caused by this syndrome may be felt in your lower back, buttocks, groin, and your thigh  How do I prepare for an SI injection? Your healthcare provider will ask you to not take any pain medicine the day of the injection  Ask him if there are any other medicines you should not take  You will need to find someone to drive you home after your procedure  What will happen during the SI injection? You will be awake for your injection  You may be given calming medicine if you are anxious  · You will lie on your stomach with a pillow under your abdomen  Your healthcare provider will give you an injection of medicine to numb the area  He may use an x-ray, ultrasound, or CT scan to find the area to inject  You may also be given an injection of contrast material to help your SI joint show up better  Then, your healthcare provider will inject local anesthesia, antiinflammatory medicine, or both into your SI joint  · Healthcare providers will watch you closely for any problems for up to 30 minutes after your injection  Your healthcare provider will check to see if your pain decreases after the injection  What are the risks of an SI injection? You may have some weakness for a short time after your injection  The SI injection can cause bleeding, infection, and pain  It can also cause temporary weakness in your leg and problems urinating  You may have an allergic reaction to the medicine that is injected into your SI joint  Your pain may return and you may need more treatment  CARE AGREEMENT:   You have the right to help plan your care  Learn about your health condition and how it may be treated  Discuss treatment options with your healthcare providers to decide what care you want to receive   You always have the right to refuse treatment  The above information is an  only  It is not intended as medical advice for individual conditions or treatments  Talk to your doctor, nurse or pharmacist before following any medical regimen to see if it is safe and effective for you  © Copyright [employer] 2021 Information is for End User's use only and may not be sold, redistributed or otherwise used for commercial purposes   All illustrations and images included in CareNotes® are the copyrighted property of A D A M , Inc  or [address] [place] [address]

## 2021-07-27 NOTE — PROGRESS NOTES
Pt has not been seen for PT in a month and will be d/c at this time  Pt continues to follow up with Pain Management

## 2021-08-16 ENCOUNTER — TELEPHONE (OUTPATIENT)
Dept: PAIN MEDICINE | Facility: CLINIC | Age: 41
End: 2021-08-16

## 2021-08-16 NOTE — TELEPHONE ENCOUNTER
Spoke to pt, rescheduled LT SIJ INJ to Wed 09/15/21 at 8am, and 4wk f/u to Wed 10/13/21 at 7am     Pt is calling to schedule his MRI today, will call us back if the MRI date is later than 9/15

## 2021-08-16 NOTE — TELEPHONE ENCOUNTER
Patient is requesting to reschedule his procedure  He states he wants to get his MRI done first prior to having his injection   Please advise, sylvia    Call back# [phone]

## 2021-09-15 ENCOUNTER — HOSPITAL ENCOUNTER (OUTPATIENT)
Dept: RADIOLOGY | Facility: CLINIC | Age: 41
Discharge: HOME | End: 2021-09-15
Attending: ANESTHESIOLOGY
Payer: COMMERCIAL

## 2021-09-15 VITALS
TEMPERATURE: 98.9 F | RESPIRATION RATE: 20 BRPM | SYSTOLIC BLOOD PRESSURE: 141 MMHG | DIASTOLIC BLOOD PRESSURE: 97 MMHG | HEART RATE: 65 BPM | OXYGEN SATURATION: 95 %

## 2021-09-15 DIAGNOSIS — M46.1 SACROILIITIS (HCC): ICD-10-CM

## 2021-09-15 PROCEDURE — 27096 INJECT SACROILIAC JOINT: CPT | Performed by: ANESTHESIOLOGY

## 2021-09-15 RX ORDER — BUPIVACAINE HCL/PF 2.5 MG/ML
30 VIAL (ML) INJECTION ONCE
Status: COMPLETED | OUTPATIENT
Start: 2021-09-15 | End: 2021-09-15

## 2021-09-15 RX ORDER — METHYLPREDNISOLONE ACETATE 40 MG/ML
40 INJECTION, SUSPENSION INTRA-ARTICULAR; INTRALESIONAL; INTRAMUSCULAR; PARENTERAL; SOFT TISSUE ONCE
Status: COMPLETED | OUTPATIENT
Start: 2021-09-15 | End: 2021-09-15

## 2021-09-15 RX ORDER — LIDOCAINE HYDROCHLORIDE 10 MG/ML
5 INJECTION, SOLUTION EPIDURAL; INFILTRATION; INTRACAUDAL; PERINEURAL ONCE
Status: COMPLETED | OUTPATIENT
Start: 2021-09-15 | End: 2021-09-15

## 2021-09-15 RX ADMIN — METHYLPREDNISOLONE ACETATE 40 MG: 40 INJECTION, SUSPENSION INTRA-ARTICULAR; INTRALESIONAL; INTRAMUSCULAR; SOFT TISSUE at 08:05

## 2021-09-15 RX ADMIN — LIDOCAINE HYDROCHLORIDE 2 ML: 10 INJECTION, SOLUTION EPIDURAL; INFILTRATION; INTRACAUDAL; PERINEURAL at 08:04

## 2021-09-15 RX ADMIN — BUPIVACAINE HYDROCHLORIDE 1.5 ML: 2.5 INJECTION, SOLUTION EPIDURAL; INFILTRATION; INTRACAUDAL at 08:05

## 2021-09-15 RX ADMIN — IOHEXOL 0.5 ML: 300 INJECTION, SOLUTION INTRAVENOUS at 08:05

## 2021-09-15 NOTE — H&P
History of Present Illness: The patient is a 42 y o  male who presents with complaints of Left-sided low back pain  Patient Active Problem List   Diagnosis    Chronic midline low back pain    Lumbar foraminal stenosis    Sacroiliitis (HCC)    Chronic bilateral low back pain without sciatica       No past medical history on file  No past surgical history on file  Current Outpatient Medications:     methylPREDNISolone 4 MG tablet therapy pack, Use as directed on package (Patient not taking: Reported on 6/10/2021), Disp: 1 each, Rfl: 0    Multiple Vitamin (multivitamin) tablet, Take 1 tablet by mouth daily, Disp: , Rfl:     Current Facility-Administered Medications:     bupivacaine (PF) (MARCAINE) 0 25 % injection 30 mL, 30 mL, Intra-articular, Once, Andrea Chunt, DO    iohexol (OMNIPAQUE) 300 mg/mL injection 50 mL, 50 mL, Intra-articular, Once, Andrea Sanderson, DO    lidocaine (PF) (XYLOCAINE-MPF) 1 % injection 5 mL, 5 mL, Other, Once, Andrea Sanderson, DO    methylPREDNISolone acetate (DEPO-MEDROL) injection 40 mg, 40 mg, Intra-articular, Once, Katie Givens, DO    No Known Allergies    Physical Exam:   Vitals:    09/15/21 0755   BP: 153/73   Pulse: 62   Resp: 20   Temp: 98 9 °F (37 2 °C)   SpO2: 95%     General: Awake, Alert, Oriented x 3, Mood and affect appropriate  Respiratory: Respirations even and unlabored  Cardiovascular: Peripheral pulses intact; no edema  Musculoskeletal Exam:   Tenderness to palpation over left SI joint  ASA Score: 2    Patient/Chart Verification  Patient ID Verified: Verbal  ID Band Applied: No  Consents Confirmed: Procedural, To be obtained in the Pre-Procedure area  H&P( within 30 days) Verified: To be obtained in the Pre-Procedure area  Allergies Reviewed: Yes  Anticoag/NSAID held?: NA  Currently on antibiotics?: No    Assessment:   1   Sacroiliitis ([facility] [address] ) - Left        Plan: Left SIJ  Injection

## 2021-09-15 NOTE — DISCHARGE INSTRUCTIONS

## 2021-09-22 ENCOUNTER — TELEPHONE (OUTPATIENT)
Dept: PAIN MEDICINE | Facility: CLINIC | Age: 41
End: 2021-09-22

## 2022-03-06 ENCOUNTER — HOSPITAL ENCOUNTER (EMERGENCY)
Facility: HOSPITAL | Age: 42
Discharge: HOME | End: 2022-03-06
Attending: EMERGENCY MEDICINE
Payer: COMMERCIAL

## 2022-03-06 ENCOUNTER — APPOINTMENT (EMERGENCY)
Dept: RADIOLOGY | Facility: HOSPITAL | Age: 42
End: 2022-03-06
Payer: COMMERCIAL

## 2022-03-06 VITALS
OXYGEN SATURATION: 93 % | SYSTOLIC BLOOD PRESSURE: 150 MMHG | RESPIRATION RATE: 18 BRPM | DIASTOLIC BLOOD PRESSURE: 100 MMHG | HEART RATE: 98 BPM | TEMPERATURE: 98 F

## 2022-03-06 DIAGNOSIS — M54.2 NECK PAIN: Primary | ICD-10-CM

## 2022-03-06 DIAGNOSIS — M79.641 RIGHT HAND PAIN: ICD-10-CM
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
## 2022-03-06 PROCEDURE — 99284 EMERGENCY DEPT VISIT MOD MDM: CPT | Performed by: EMERGENCY MEDICINE

## 2022-03-06 PROCEDURE — 73130 X-RAY EXAM OF HAND: CPT

## 2022-03-06 PROCEDURE — 70450 CT HEAD/BRAIN W/O DYE: CPT

## 2022-03-06 PROCEDURE — 72125 CT NECK SPINE W/O DYE: CPT

## 2022-03-06 PROCEDURE — G1004 CDSM NDSC: HCPCS

## 2022-03-06 PROCEDURE — 99284 EMERGENCY DEPT VISIT MOD MDM: CPT

## 2022-03-06 PROCEDURE — 71046 X-RAY EXAM CHEST 2 VIEWS: CPT

## 2022-03-06 RX ORDER — IBUPROFEN 400 MG/1
400 TABLET ORAL ONCE
Status: DISCONTINUED | OUTPATIENT
Start: 2022-03-06 | End: 2022-03-06 | Stop reason: HOSPADM
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
## 2024-12-04 ENCOUNTER — TELEPHONE (OUTPATIENT)
Age: 44
End: 2024-12-04

## 2024-12-04 NOTE — TELEPHONE ENCOUNTER
Caller: Shaquille     Doctor: Maria E     Reason for call: Please mail new patient paperwork to address on file. Thank you     Call back#: [phone]

## 2025-01-03 ENCOUNTER — CONSULT (OUTPATIENT)
Dept: PAIN MEDICINE | Facility: CLINIC | Age: 45
End: 2025-01-03
Payer: COMMERCIAL

## 2025-01-03 VITALS — HEIGHT: 71 IN | BODY MASS INDEX: 40.32 KG/M2 | WEIGHT: 288 LBS

## 2025-01-03 DIAGNOSIS — M54.50 CHRONIC LOW BACK PAIN WITHOUT SCIATICA, UNSPECIFIED BACK PAIN LATERALITY: ICD-10-CM

## 2025-01-03 DIAGNOSIS — G89.29 CHRONIC LOW BACK PAIN WITHOUT SCIATICA, UNSPECIFIED BACK PAIN LATERALITY: ICD-10-CM

## 2025-01-03 DIAGNOSIS — M47.816 LUMBAR SPONDYLOSIS: ICD-10-CM

## 2025-01-03 DIAGNOSIS — M46.1 SACROILIITIS (HCC): ICD-10-CM

## 2025-01-03 DIAGNOSIS — M43.16 SPONDYLOLISTHESIS, LUMBAR REGION: Primary | ICD-10-CM

## 2025-01-03 DIAGNOSIS — M43.06 LUMBAR SPONDYLOLYSIS: ICD-10-CM

## 2025-01-03 PROCEDURE — 99204 OFFICE O/P NEW MOD 45 MIN: CPT | Performed by: ANESTHESIOLOGY

## 2025-01-03 NOTE — PROGRESS NOTES
Assessment  1. Spondylolisthesis, lumbar region    2. Lumbar spondylolysis    3. Lumbar spondylosis    4. Sacroiliitis (HCC)    5. Chronic low back pain without sciatica, unspecified back pain laterality        Plan  44-year-old male last seen in September 2021, returning for interval consultation regarding recurrent lumbosacral back pain without any radicular symptoms into his lower extremities.  The pain has recently worsened over the past year without any specific trauma or inciting event.  He has had bilateral SI joint injections with the last 1 giving him about 70% of relief .  CT of the abdomen and pelvis from December 2023 demonstrates grade 1 anterolisthesis of L5 on S1 with likely bilateral L5 pars defects.  No recent dedicated imaging of the lumbar spine to review.  Patient did physical therapy from 8/12/2021 through June 30, 2021 and continues to do his home exercise program 30 to 45 minutes each day, at least 5 days/week with minimal relief.  Tylenol and NSAIDs provide minimal relief.  The patient's low back pain seems to be multifactorial including SI mediated components and likely facet mediated components.  Discogenic component also on the differential.    1.  I will order flexion and extension x-rays of the lumbar spine to evaluate for dynamic instability as well as an MRI of the lumbar spine without contrast  2.  Patient will continue with HEP  3.  I will follow-up with the patient in 6 weeks or sooner if needed        My impressions and treatment recommendations were discussed in detail with the patient who verbalized understanding and had no further questions.  Discharge instructions were provided. I personally saw and examined the patient and I agree with the above discussed plan of care.    No orders of the defined types were placed in this encounter.    No orders of the defined types were placed in this encounter.      History of Present Illness    Shaquille Bee is a 44 y.o. male last seen in  September 2021, returning for interval consultation regarding recurrent lumbosacral back pain without any radicular symptoms into his lower extremities.  The pain has recently worsened over the past year without any specific trauma or inciting event.  He denies any bladder or bowel incontinence or saddle anesthesia.  He has had bilateral SI joint injections with the last 1 giving him about 70% of relief .  CT of the abdomen and pelvis from December 2023 demonstrates grade 1 anterolisthesis of L5 on S1 with likely bilateral L5 pars defects.  No recent dedicated imaging of the lumbar spine to review.  Patient did physical therapy from 8/12/2021 through June 30, 2021 and continues to do his home exercise program 30 to 45 minutes each day, at least 5 days/week with minimal relief.  Tylenol and NSAIDs provide minimal relief.  The patient rates his pain a 10 out of 10 and the pain is constant.  The pain does not follow any particular pattern throughout the day.  The pain is described as sharp, pressure-like, and throbbing.  The pain is increased with lying down, standing, bending, relaxation, coughing, and sneezing.  He has found some relief with injections in the past.      Other than as stated above, the patient denies any interval changes in medications, medical condition, mental condition, symptoms, or allergies since the last office visit.    I have personally reviewed and/or updated the patient's past medical history, past surgical history, family history, social history, current medications, allergies, and vital signs today.     Review of Systems   Constitutional:  Negative for fever and unexpected weight change.   HENT:  Negative for trouble swallowing.    Eyes:  Negative for visual disturbance.   Respiratory:  Negative for shortness of breath and wheezing.    Cardiovascular:  Negative for chest pain and palpitations.   Gastrointestinal:  Negative for constipation, diarrhea, nausea and vomiting.   Endocrine:  "Negative for cold intolerance, heat intolerance and polydipsia.   Genitourinary:  Negative for difficulty urinating and frequency.   Musculoskeletal:  Positive for myalgias. Negative for arthralgias, gait problem and joint swelling.   Skin:  Negative for rash.   Neurological:  Negative for dizziness, seizures, syncope, weakness and headaches.   Hematological:  Does not bruise/bleed easily.   Psychiatric/Behavioral:  Negative for dysphoric mood.    All other systems reviewed and are negative.      Patient Active Problem List   Diagnosis    Chronic midline low back pain    Lumbar foraminal stenosis    Sacroiliitis (HCC)    Chronic bilateral low back pain without sciatica       No past medical history on file.    No past surgical history on file.    No family history on file.    Social History     Occupational History    Not on file   Tobacco Use    Smoking status: Some Days     Current packs/day: 0.25     Types: Cigarettes    Smokeless tobacco: Never   Substance and Sexual Activity    Alcohol use: Yes     Alcohol/week: 2.0 standard drinks of alcohol     Types: 2 Shots of liquor per week    Drug use: Never    Sexual activity: Not on file       Current Outpatient Medications on File Prior to Visit   Medication Sig    methylPREDNISolone 4 MG tablet therapy pack Use as directed on package (Patient not taking: Reported on 6/10/2021)    Multiple Vitamin (multivitamin) tablet Take 1 tablet by mouth daily     No current facility-administered medications on file prior to visit.       No Known Allergies    Physical Exam    Ht 5' 11\" (1.803 m)   Wt 131 kg (288 lb)   BMI 40.17 kg/m²     Constitutional: normal, well developed, well nourished, alert, in no distress and non-toxic and no overt pain behavior.  Eyes: anicteric  HEENT: grossly intact  Neck: supple, symmetric, trachea midline and no masses   Pulmonary:even and unlabored  Cardiovascular:No edema or pitting edema present  Skin:Normal without rashes or lesions and well " hydrated  Psychiatric:Mood and affect appropriate  Neurologic:Cranial Nerves II-XII grossly intact  Musculoskeletal:normal gait.  Bilateral lumbar paraspinals tender to palpation from L4-S1.  Bilateral SI joints minimally tender to palpation.  Bilateral patellar and Achilles reflexes were 2 out of 4 and symmetrical.  No clonus was noted bilaterally.  Bilateral lower extremity strength 5 out of 5 in all muscle groups.  Sensation intact to light touch in L3-S1 dermatomes bilaterally.  Negative straight leg raise bilaterally.  Positive Wilbert's and Gaenslen's test bilaterally.  Positive AP compression test bilaterally.    Imaging    Study Result    Narrative & Impression   LUMBAR SPINE     INDICATION:   M54.5: Low back pain  G89.29: Other chronic pain.     COMPARISON:  None     VIEWS:  XR SPINE LUMBAR MINIMUM 4 VIEWS NON INJURY  Upright images.     FINDINGS:     There are 5 non rib bearing lumbar vertebral bodies.      There is no evidence of acute fracture or destructive osseous lesion.     Chronic bilateral pars defect at L5 with grade 2 spondylolisthesis of L5 on S1.      Moderate disc height loss and minimal facet degenerative changes at L5-S1.     Soft tissues are unremarkable.     IMPRESSION:     No acute osseous abnormality.     Chronic bilateral pars defect at L5 with grade 2 spondylolisthesis of L5 on S1.     Degenerative changes as described.        Workstation performed: [id number]

## 2025-01-15 ENCOUNTER — TELEPHONE (OUTPATIENT)
Age: 45
End: 2025-01-15

## 2025-01-15 DIAGNOSIS — F41.9 ANXIETY: Primary | ICD-10-CM

## 2025-01-15 RX ORDER — DIAZEPAM 5 MG/1
TABLET ORAL
Qty: 2 TABLET | Refills: 0 | Status: SHIPPED | OUTPATIENT
Start: 2025-01-15

## 2025-01-15 NOTE — TELEPHONE ENCOUNTER
Caller: Patient    Doctor: ROMINA    Reason for call: Calling back for a script for anxiety before his MRI Appt    Appt is for 1/19 and would like the script send to [id number] - [place], [place] - [address]     Patient aware [facility] is waiting for DR Patino     Call back#:

## 2025-01-15 NOTE — TELEPHONE ENCOUNTER
Caller: Shaquille AMBROSE    Doctor: Dr Sanderson    Reason for call: Pt called in to get a script for medication for his MRI to be sent to the [facility] on file.     Call back#: [phone]

## 2025-01-19 ENCOUNTER — HOSPITAL ENCOUNTER (OUTPATIENT)
Dept: RADIOLOGY | Facility: HOSPITAL | Age: 45
Discharge: HOME | End: 2025-01-19
Payer: COMMERCIAL

## 2025-01-19 DIAGNOSIS — M43.06 LUMBAR SPONDYLOLYSIS: ICD-10-CM

## 2025-01-19 DIAGNOSIS — G89.29 CHRONIC LOW BACK PAIN WITHOUT SCIATICA, UNSPECIFIED BACK PAIN LATERALITY: ICD-10-CM

## 2025-01-19 DIAGNOSIS — M43.16 SPONDYLOLISTHESIS, LUMBAR REGION: ICD-10-CM

## 2025-01-19 DIAGNOSIS — M54.50 CHRONIC LOW BACK PAIN WITHOUT SCIATICA, UNSPECIFIED BACK PAIN LATERALITY: ICD-10-CM

## 2025-01-19 PROCEDURE — 72148 MRI LUMBAR SPINE W/O DYE: CPT

## 2025-02-12 NOTE — PROGRESS NOTES
Assessment:  1. Sacroiliitis (HCC)    2. Spondylolisthesis, lumbar region    3. Lumbar spondylolysis    4. Lumbar spondylosis    5. Chronic midline low back pain without sciatica        Plan:  Based on patient report and physical exam, the patient's symptomatology does seem to be consistent with sacroiliac mediated pain from sacroiliitis. We will schedule the patient for a bilateral SIJ injection to decrease any inflammatory component of the patient's pain symptoms.  Complete risks and benefits including bleeding, infection, tissue reaction, nerve injury and allergic reaction were discussed. The patient was agreeable and verbalized an understanding    Depending on response to SIJ - can also consider B/L L5 TFESI.    I will trial meloxicam 15 mg daily as needed pain Patient was advised to avoid NSAIDs while on this medication and to take the medication with food to avoid GI upset  Patient will continue with his home exercise program as taught by physical therapy  Can consider a trial of chiropractic therapy in the future  Patient may continue Tylenol as needed  Follow-up after procedure or sooner if needed    History of Present Illness:    The patient is a 44 y.o. male last seen on 01/03/2025  who presents for a follow up office visit in regards to chroniclow back pain which is nonradiating into the lower extremities.  He denies bowel or bladder incontinence or saddle anesthesia.      MRI of the lumbar spine demonstrates degenerative disc disease from L3-4 to L5-S1 with grade 2 spondylolisthesis secondary to spondylolysis at L5-S1 with moderate to severe bilateral foraminal narrowing and mass effect upon both L5 nerve roots at this level.    The patient has had bilateral SI joint injections in the past, last performed September 2021 which provided 70% relief of his pain for about 3 months.  He takes Tylenol or over-the-counter ibuprofen on occasion. He states he tries to avoid medications secondary to a bad  "experience in the past when he was taking Opana and unfortunately had withdrawal symptoms when stopping.  He has gotten some relief in the past with physical therapy and does continue with his home exercise regiment on a daily basis for 30 to 45 minutes a day    The patient rates his pain a 10 out of 10 on the numeric pain rating scale.  Pain is constant described as sharp, throbbing and shooting    I have personally reviewed and/or updated the patient's past medical history, past surgical history, family history, social history, current medications, allergies, and vital signs today.       Review of Systems:    Review of Systems      History reviewed. No pertinent past medical history.    History reviewed. No pertinent surgical history.    History reviewed. No pertinent family history.    Social History     Occupational History    Not on file   Tobacco Use    Smoking status: Some Days     Current packs/day: 0.25     Types: Cigarettes    Smokeless tobacco: Never   Substance and Sexual Activity    Alcohol use: Yes     Alcohol/week: 2.0 standard drinks of alcohol     Types: 2 Shots of liquor per week    Drug use: Never    Sexual activity: Not on file         Current Outpatient Medications:     meloxicam (MOBIC) 15 mg tablet, Take 1 tablet (15 mg total) by mouth daily as needed for mild pain or moderate pain, Disp: 30 tablet, Rfl: 1    diazepam (VALIUM) 5 mg tablet, Take 1 tablet 1 hour prior to MRI and may take additional tablet 30 minutes prior to MRI as needed for anxiety (Patient not taking: Reported on 2/13/2025), Disp: 2 tablet, Rfl: 0    methylPREDNISolone 4 MG tablet therapy pack, Use as directed on package (Patient not taking: Reported on 6/10/2021), Disp: 1 each, Rfl: 0    Multiple Vitamin (multivitamin) tablet, Take 1 tablet by mouth daily (Patient not taking: Reported on 2/13/2025), Disp: , Rfl:     No Known Allergies    Physical Exam:    Ht 5' 11\" (1.803 m)   Wt 131 kg (288 lb)   BMI 40.17 kg/m² "     Constitutional:normal, well developed, well nourished, alert, in no distress and non-toxic and no overt pain behavior.  Eyes:anicteric  HEENT:grossly intact  Neck:supple, symmetric, trachea midline and no masses   Pulmonary:even and unlabored  Cardiovascular:No edema or pitting edema present  Skin:Normal without rashes or lesions and well hydrated  Psychiatric:Mood and affect appropriate  Neurologic:Cranial Nerves II-XII grossly intact  Musculoskeletal:antalgic gait.  Bilateral SI joints tender to palpation.  Bilateral lumbar paraspinal musculature mildly tender to palpation.  Bilateral lower extremity strength 5 out of 5 in all muscle groups.  Sensation intact to light touch in L3-S1 dermatomes bilaterally.  Negative straight leg raise bilaterally.  Positive Wilbert's, AP compression and Gaenslen's bilaterally      Imaging  [facility] spine and pain procedure    (Results Pending)       Narrative & Impression  MRI LUMBAR SPINE WITHOUT CONTRAST     INDICATION: M43.16: Spondylolisthesis, lumbar region  M43.06: Spondylolysis, lumbar region  M54.50: Low back pain, unspecified  G89.29: Other chronic pain.     COMPARISON: X-rays dated 5/11/2021     TECHNIQUE:  Multiplanar, multisequence imaging of the lumbar spine was performed. .        IMAGE QUALITY:  Diagnostic     FINDINGS:     VERTEBRAL BODIES:  There are 5 lumbar type vertebral bodies. Grade 2 anterior spondylolisthesis of L5 upon S1 with chronic L5 spondylolysis. Heterogeneous signal within the L5-S1 endplates, including moderate amount of Modic type I marrow edema. No acute   compression fracture.     Incidentally noted Schmorl's noted identified within the superior endplate of T11 at the superior margin of this exam, chronic in appearance with no associated edema.     SACRUM:  Normal signal within the sacrum. No evidence of insufficiency or stress fracture.     DISTAL CORD AND CONUS:  Normal size and signal within the distal cord and conus.     PARASPINAL SOFT  TISSUES:  Paraspinal soft tissues are unremarkable.     LOWER THORACIC DISC SPACES:  Normal disc height and signal.  No disc herniation, canal stenosis or foraminal narrowing.     LUMBAR DISC SPACES:     L1-L2:  Normal.     L2-L3:  Normal.     L3-L4: Bulging with a small central disc herniation and associated annular fissure. Minor facet arthropathy. No canal stenosis. No foraminal nerve impingement.     L4-L5: Annular bulging with a small central annular fissure. Minimal facet arthropathy. No canal stenosis or foraminal nerve impingement.     L5-S1: Chronic spondylolysis with grade 2 anterior spondylolisthesis. Disc desiccation and loss of disc height with circumferential annular bulging and uncovering of the cephalad disc margin. There is mild bilateral facet arthropathy. No significant   central canal stenosis. There is moderately severe bilateral foraminal narrowing with mass effect upon both L5 nerves.     OTHER FINDINGS:  None.     IMPRESSION:     Chronic L5 spondylolysis with grade 2 spondylolisthesis, annular bulging and uncovering of the cephalad disc margin with moderately severe bilateral foraminal stenosis. Correlate for L5 radiculopathy.       Orders Placed This Encounter   Procedures    [place] spine and pain procedure

## 2025-02-13 ENCOUNTER — OFFICE VISIT (OUTPATIENT)
Dept: PAIN MEDICINE | Facility: CLINIC | Age: 45
End: 2025-02-13
Payer: COMMERCIAL

## 2025-02-13 ENCOUNTER — TELEPHONE (OUTPATIENT)
Dept: RADIOLOGY | Facility: CLINIC | Age: 45
End: 2025-02-13

## 2025-02-13 VITALS — HEIGHT: 71 IN | WEIGHT: 278 LBS | BODY MASS INDEX: 38.92 KG/M2

## 2025-02-13 DIAGNOSIS — M54.50 CHRONIC MIDLINE LOW BACK PAIN WITHOUT SCIATICA: ICD-10-CM

## 2025-02-13 DIAGNOSIS — M46.1 SACROILIITIS (HCC): Primary | ICD-10-CM

## 2025-02-13 DIAGNOSIS — M43.16 SPONDYLOLISTHESIS, LUMBAR REGION: ICD-10-CM

## 2025-02-13 DIAGNOSIS — M47.816 LUMBAR SPONDYLOSIS: ICD-10-CM

## 2025-02-13 DIAGNOSIS — M43.06 LUMBAR SPONDYLOLYSIS: ICD-10-CM

## 2025-02-13 DIAGNOSIS — G89.29 CHRONIC MIDLINE LOW BACK PAIN WITHOUT SCIATICA: ICD-10-CM

## 2025-02-13 PROCEDURE — 99214 OFFICE O/P EST MOD 30 MIN: CPT | Performed by: NURSE PRACTITIONER

## 2025-02-13 RX ORDER — MELOXICAM 15 MG/1
15 TABLET ORAL DAILY PRN
Qty: 30 TABLET | Refills: 1 | Status: SHIPPED | OUTPATIENT
Start: 2025-02-13

## 2025-02-13 NOTE — PATIENT INSTRUCTIONS
Patient Education     Meloxicam (darren OKS i ivy)   Brand Names: US Anjeso [DSC]; Mobic [DSC]; Qmiiz ODT [DSC]; Vivlodex [DSC]   Brand Names: Alexsander ACT Meloxicam [DSC]; APO-Meloxicam; Auro-Meloxicam; PMS-Meloxicam; TEVA-Meloxicam   Warning   This drug may raise the risk of heart and blood vessel problems like heart attack and stroke. These effects can be deadly. The risk may be greater if you have heart disease or risks for heart disease. However, it can also be raised even if you do not have heart disease or risks for heart disease. The risk can happen within the first weeks of using this drug and may be greater with higher doses or long-term use. Do not use this drug right before or after bypass heart surgery.  This drug may raise the chance of severe and sometimes deadly stomach or bowel problems like ulcers or bleeding. The risk is greater in older people, and in people who have had stomach or bowel ulcers or bleeding before. These problems may occur without warning signs.  What is this drug used for?   It is used to treat some types of arthritis.  It is used to manage pain.  It may be given to you for other reasons. Talk with the doctor.  What do I need to tell my doctor BEFORE I take this drug?   All products:   If you are allergic to this drug; any part of this drug; or any other drugs, foods, or substances. Tell your doctor about the allergy and what signs you had.  If you have an allergy to aspirin or nonsteroidal anti-inflammatory drugs (NSAIDs) like ibuprofen or naproxen.  If you have any of these health problems: GI (gastrointestinal) bleeding or kidney problems.  If you have any of these health problems: Heart failure (weak heart) or a recent heart attack.  If you are taking any other NSAID, a salicylate drug like aspirin, or pemetrexed.  If you are having trouble getting pregnant or you are having your fertility checked.  If you are pregnant, plan to become pregnant, or get pregnant while taking this  drug. This drug may cause harm to an unborn baby if taken at 20 weeks or later in pregnancy. If you are between 20 to 30 weeks of pregnancy, only take this drug if your doctor has told you to. Do not take this drug if you are more than 30 weeks pregnant.  Suspension:   If you are taking sodium polystyrene sulfonate.  Oral-disintegrating tablet:   If you have phenylketonuria (PKU). This drug has phenylalanine in it.  This is not a list of all drugs or health problems that interact with this drug.  Tell your doctor and pharmacist about all of your drugs (prescription or OTC, natural products, vitamins) and health problems. You must check to make sure that it is safe for you to take this drug with all of your drugs and health problems. Do not start, stop, or change the dose of any drug without checking with your doctor.  What are some things I need to know or do while I take this drug?   Tell all of your health care providers that you take this drug. This includes your doctors, nurses, pharmacists, and dentists.  Do not take more than what your doctor told you to take. Taking more than you are told may raise your chance of very bad side effects.  Do not take this drug for longer than you were told by your doctor.  Have your blood work checked if you are on this drug for a long time. Talk with your doctor.  High blood pressure has happened with drugs like this one. Have your blood pressure checked as you have been told by your doctor.  Talk with your doctor before you drink alcohol.  If you smoke, talk with your doctor.  If you have asthma, talk with your doctor. You may be more sensitive to this drug.  You may bleed more easily. Be careful and avoid injury. Use a soft toothbrush and an electric razor.  The chance of heart failure is raised with the use of drugs like this one. In people who already have heart failure, the chance of heart attack, having to go to the hospital for heart failure, and death is raised. Talk  with the doctor.  The chance of heart attack and heart-related death is raised in people taking drugs like this one after a recent heart attack. People taking drugs like this one after a first heart attack were also more likely to die in the year after the heart attack compared with people not taking drugs like this one. Talk with the doctor.  If you are taking aspirin to help prevent a heart attack, talk with your doctor.  Liver problems have happened with drugs like this one. Sometimes, this has been deadly. Call your doctor right away if you have signs of liver problems like dark urine, tiredness, decreased appetite, upset stomach or stomach pain, light-colored stools, throwing up, or yellow skin or eyes.  A severe and sometimes deadly reaction has happened with drugs like this one. Most of the time, this reaction has signs like fever, rash, or swollen glands with problems in body organs like the liver, kidney, blood, heart, muscles and joints, or lungs. If you have questions, talk with the doctor.  If you are 65 or older, use this drug with care. You could have more side effects.  NSAIDs like this drug may affect egg release (ovulation). This may affect being able to get pregnant. This goes back to normal when this drug is stopped. Talk with the doctor.  This drug has caused fertility problems in male animals. Fertility problems may affect being able to father a child. If this effect happens, it is not known if it will go back to normal.  Tell your doctor if you are breast-feeding. You will need to talk about any risks to your baby.  What are some side effects that I need to call my doctor about right away?   WARNING/CAUTION: Even though it may be rare, some people may have very bad and sometimes deadly side effects when taking a drug. Tell your doctor or get medical help right away if you have any of the following signs or symptoms that may be related to a very bad side effect:  Signs of an allergic reaction,  like rash; hives; itching; red, swollen, blistered, or peeling skin with or without fever; wheezing; tightness in the chest or throat; trouble breathing, swallowing, or talking; unusual hoarseness; or swelling of the mouth, face, lips, tongue, or throat.  Signs of bleeding like throwing up or coughing up blood; vomit that looks like coffee grounds; blood in the urine; black, red, or tarry stools; bleeding from the gums; abnormal vaginal bleeding; bruises without a cause or that get bigger; or bleeding you cannot stop.  Signs of kidney problems like unable to pass urine, change in how much urine is passed, blood in the urine, or a big weight gain.  Signs of high potassium levels like a heartbeat that does not feel normal; feeling confused; feeling weak, lightheaded, or dizzy; feeling like passing out; numbness or tingling; or shortness of breath.  Signs of high blood pressure like very bad headache or dizziness, passing out, or change in eyesight.  Shortness of breath, a big weight gain, or swelling in the arms or legs.  Chest pain or pressure.  Weakness on 1 side of the body, trouble speaking or thinking, change in balance, drooping on one side of the face, or blurred eyesight.  Feeling very tired or weak.  Flu-like signs.  Swollen gland.  A severe skin reaction (Church-Kip syndrome/toxic epidermal necrolysis) may happen. It can cause severe health problems that may not go away, and sometimes death. Get medical help right away if you have signs like red, swollen, blistered, or peeling skin (with or without fever); red or irritated eyes; or sores in your mouth, throat, nose, or eyes.  What are some other side effects of this drug?   All drugs may cause side effects. However, many people have no side effects or only have minor side effects. Call your doctor or get medical help if any of these side effects or any other side effects bother you or do not go away:  All oral products:   Constipation, diarrhea, stomach  pain, upset stomach, throwing up, or decreased appetite.  Dizziness or headache.  Heartburn.  Gas.  Signs of a common cold.  Injection:   Constipation.  These are not all of the side effects that may occur. If you have questions about side effects, call your doctor. Call your doctor for medical advice about side effects.  You may report side effects to your national health agency.  You may report side effects to the FDA at 1-475.766.7645. You may also report side effects at https://www.fda.gov/medwatch.  How is this drug best taken?   Use this drug as ordered by your doctor. Read all information given to you. Follow all instructions closely.  All oral products:   Take with or without food. Take with food if it causes an upset stomach.  Suspension:   Shake well before use.  Measure liquid doses carefully. Use the measuring device that comes with this drug. If there is none, ask the pharmacist for a device to measure this drug.  Oral-disintegrating tablet:   Do not take this drug out of the blister pack until you are ready to take it. Take this drug right away after opening the blister pack. Do not store the removed drug for future use.  Peel back the foil on the blister. Do not push the tablet out of the foil when opening. Use dry hands to take it from the foil. Place on your tongue and let it dissolve. Water is not needed. Do not chew, break, or crush the tablet.  Injection:   It is given as a shot into a vein.  What do I do if I miss a dose?   All oral products:   Take a missed dose as soon as you think about it.  If it is close to the time for your next dose, skip the missed dose and go back to your normal time.  Do not take 2 doses at the same time or extra doses.  Injection:   Call your doctor to find out what to do.  How do I store and/or throw out this drug?   All oral products:   Store at room temperature in a dry place. Do not store in a bathroom.  Protect from heat.  Capsules, tablets, and suspension:    Keep lid tightly closed.  Capsules:   Store in original container.  Injection:   If you need to store this drug at home, talk with your doctor, nurse, or pharmacist about how to store it.  All products:   Keep all drugs in a safe place. Keep all drugs out of the reach of children and pets.  Throw away unused or  drugs. Do not flush down a toilet or pour down a drain unless you are told to do so. Check with your pharmacist if you have questions about the best way to throw out drugs. There may be drug take-back programs in your area.  General drug facts   If your symptoms or health problems do not get better or if they become worse, call your doctor.  Do not share your drugs with others and do not take anyone else's drugs.  Some drugs may have another patient information leaflet. If you have any questions about this drug, please talk with your doctor, nurse, pharmacist, or other health care provider.  This drug comes with an extra patient fact sheet called a Medication Guide. Read it with care. Read it again each time this drug is refilled. If you have any questions about this drug, please talk with the doctor, pharmacist, or other health care provider.  If you think there has been an overdose, call your poison control center or get medical care right away. Be ready to tell or show what was taken, how much, and when it happened.  Consumer Information Use and Disclaimer   This generalized information is a limited summary of diagnosis, treatment, and/or medication information. It is not meant to be comprehensive and should be used as a tool to help the user understand and/or assess potential diagnostic and treatment options. It does NOT include all information about conditions, treatments, medications, side effects, or risks that may apply to a specific patient. It is not intended to be medical advice or a substitute for the medical advice, diagnosis, or treatment of a health care provider based on the health  care provider's examination and assessment of a patient's specific and unique circumstances. Patients must speak with a health care provider for complete information about their health, medical questions, and treatment options, including any risks or benefits regarding use of medications. This information does not endorse any treatments or medications as safe, effective, or approved for treating a specific patient. [employer] and its affiliates disclaim any warranty or liability relating to this information or the use thereof. The use of this information is governed by the Terms of Use, available at https://www.[employer].com/en/know/clinical-effectiveness-terms.  Last Reviewed Date   2021-05-14  Copyright   © 2024 [employer] and its affiliates and/or licensors. All rights reserved.

## 2025-02-13 NOTE — TELEPHONE ENCOUNTER
Patient ins is out of network for procedure.   Called /  [phone]   Est for procedure code: /[id number] is $[phone].    Called patient to advise- he is going to call his ins company to try an switch his plan.   He asked to be kept on for now  He will call back to update up within a day or two